# Patient Record
Sex: MALE | Race: WHITE | NOT HISPANIC OR LATINO | URBAN - METROPOLITAN AREA
[De-identification: names, ages, dates, MRNs, and addresses within clinical notes are randomized per-mention and may not be internally consistent; named-entity substitution may affect disease eponyms.]

---

## 2018-07-17 ENCOUNTER — OFFICE VISIT (OUTPATIENT)
Dept: FAMILY MEDICINE | Facility: CLINIC | Age: 43
End: 2018-07-17
Payer: COMMERCIAL

## 2018-07-17 VITALS
TEMPERATURE: 98.4 F | HEART RATE: 84 BPM | WEIGHT: 180 LBS | BODY MASS INDEX: 25.77 KG/M2 | OXYGEN SATURATION: 95 % | SYSTOLIC BLOOD PRESSURE: 119 MMHG | HEIGHT: 70 IN | DIASTOLIC BLOOD PRESSURE: 75 MMHG

## 2018-07-17 DIAGNOSIS — F41.1 GAD (GENERALIZED ANXIETY DISORDER): ICD-10-CM

## 2018-07-17 DIAGNOSIS — N52.9 ERECTILE DYSFUNCTION, UNSPECIFIED ERECTILE DYSFUNCTION TYPE: Primary | ICD-10-CM

## 2018-07-17 LAB
ALBUMIN SERPL-MCNC: 4.1 G/DL (ref 3.4–5)
ALP SERPL-CCNC: 53 U/L (ref 40–150)
ALT SERPL W P-5'-P-CCNC: 27 U/L (ref 0–70)
ANION GAP SERPL CALCULATED.3IONS-SCNC: 9 MMOL/L (ref 3–14)
AST SERPL W P-5'-P-CCNC: 16 U/L (ref 0–45)
BILIRUB SERPL-MCNC: 0.3 MG/DL (ref 0.2–1.3)
BUN SERPL-MCNC: 16 MG/DL (ref 7–30)
CALCIUM SERPL-MCNC: 8.9 MG/DL (ref 8.5–10.1)
CHLORIDE SERPL-SCNC: 105 MMOL/L (ref 94–109)
CHOLEST SERPL-MCNC: 257 MG/DL
CO2 SERPL-SCNC: 27 MMOL/L (ref 20–32)
CREAT SERPL-MCNC: 1.17 MG/DL (ref 0.66–1.25)
ERYTHROCYTE [DISTWIDTH] IN BLOOD BY AUTOMATED COUNT: 12.8 % (ref 10–15)
GFR SERPL CREATININE-BSD FRML MDRD: 69 ML/MIN/1.7M2
GLUCOSE SERPL-MCNC: 102 MG/DL (ref 70–99)
HBA1C MFR BLD: 5.2 % (ref 0–5.6)
HCT VFR BLD AUTO: 42.9 % (ref 40–53)
HDLC SERPL-MCNC: 62 MG/DL
HGB BLD-MCNC: 14.9 G/DL (ref 13.3–17.7)
LDLC SERPL CALC-MCNC: 162 MG/DL
MCH RBC QN AUTO: 30.7 PG (ref 26.5–33)
MCHC RBC AUTO-ENTMCNC: 34.7 G/DL (ref 31.5–36.5)
MCV RBC AUTO: 88 FL (ref 78–100)
NONHDLC SERPL-MCNC: 195 MG/DL
PLATELET # BLD AUTO: 171 10E9/L (ref 150–450)
POTASSIUM SERPL-SCNC: 3.4 MMOL/L (ref 3.4–5.3)
PROT SERPL-MCNC: 7.8 G/DL (ref 6.8–8.8)
RBC # BLD AUTO: 4.86 10E12/L (ref 4.4–5.9)
SODIUM SERPL-SCNC: 141 MMOL/L (ref 133–144)
TRIGL SERPL-MCNC: 164 MG/DL
VIT B12 SERPL-MCNC: 292 PG/ML (ref 193–986)
WBC # BLD AUTO: 6.2 10E9/L (ref 4–11)

## 2018-07-17 PROCEDURE — 85027 COMPLETE CBC AUTOMATED: CPT | Performed by: FAMILY MEDICINE

## 2018-07-17 PROCEDURE — 80061 LIPID PANEL: CPT | Performed by: FAMILY MEDICINE

## 2018-07-17 PROCEDURE — 36415 COLL VENOUS BLD VENIPUNCTURE: CPT | Performed by: FAMILY MEDICINE

## 2018-07-17 PROCEDURE — 80053 COMPREHEN METABOLIC PANEL: CPT | Performed by: FAMILY MEDICINE

## 2018-07-17 PROCEDURE — 99214 OFFICE O/P EST MOD 30 MIN: CPT | Performed by: FAMILY MEDICINE

## 2018-07-17 PROCEDURE — 82607 VITAMIN B-12: CPT | Performed by: FAMILY MEDICINE

## 2018-07-17 PROCEDURE — 83036 HEMOGLOBIN GLYCOSYLATED A1C: CPT | Performed by: FAMILY MEDICINE

## 2018-07-17 RX ORDER — SILDENAFIL 25 MG/1
25 TABLET, FILM COATED ORAL DAILY PRN
Qty: 12 TABLET | Refills: 1 | Status: SHIPPED | OUTPATIENT
Start: 2018-07-17 | End: 2018-07-17 | Stop reason: ALTCHOICE

## 2018-07-17 RX ORDER — SILDENAFIL CITRATE 20 MG/1
20 TABLET ORAL PRN
Qty: 30 TABLET | Refills: 1 | Status: SHIPPED | OUTPATIENT
Start: 2018-07-17 | End: 2024-03-01

## 2018-07-17 ASSESSMENT — ANXIETY QUESTIONNAIRES
1. FEELING NERVOUS, ANXIOUS, OR ON EDGE: SEVERAL DAYS
3. WORRYING TOO MUCH ABOUT DIFFERENT THINGS: SEVERAL DAYS
GAD7 TOTAL SCORE: 6
6. BECOMING EASILY ANNOYED OR IRRITABLE: SEVERAL DAYS
7. FEELING AFRAID AS IF SOMETHING AWFUL MIGHT HAPPEN: NOT AT ALL
2. NOT BEING ABLE TO STOP OR CONTROL WORRYING: SEVERAL DAYS
5. BEING SO RESTLESS THAT IT IS HARD TO SIT STILL: SEVERAL DAYS
IF YOU CHECKED OFF ANY PROBLEMS ON THIS QUESTIONNAIRE, HOW DIFFICULT HAVE THESE PROBLEMS MADE IT FOR YOU TO DO YOUR WORK, TAKE CARE OF THINGS AT HOME, OR GET ALONG WITH OTHER PEOPLE: SOMEWHAT DIFFICULT

## 2018-07-17 ASSESSMENT — PATIENT HEALTH QUESTIONNAIRE - PHQ9: 5. POOR APPETITE OR OVEREATING: SEVERAL DAYS

## 2018-07-17 NOTE — LETTER
July 24, 2018      Kenroy Garrido  [address] [facility], [place] [address]        Dear ,    We are writing to inform you of your test results.    We have tried to contact you by phone several times.     Your recent labs looked good except cholesterol was high.    Complete Metabolic Panel (panel that checks liver function, kidney function and electrolytes) was normal.   A1c was normal- no evidence of Diabetes.  Vitamin B12 was on the low side of normal. Recommend taking over the counter Vit B12 supplementation 1000 mcg/day.    Cholesterol was elevated.   Your LDL (bad cholesterol) goal is < 130; yours was 162.   Recommend in addition to eating a heart healthy diet to start a cholesterol lowering medication.  Rx sent  Will repeat labs in 3-6 months. You can schedule a fasting lab appointment then.      Resulted Orders   Lipid Profile   Result Value Ref Range    Cholesterol 257 (H) <200 mg/dL      Comment:      Desirable:       <200 mg/dl    Triglycerides 164 (H) <150 mg/dL      Comment:      Borderline high:  150-199 mg/dl  High:             200-499 mg/dl  Very high:       >499 mg/dl  Non Fasting      HDL Cholesterol 62 >39 mg/dL    LDL Cholesterol Calculated 162 (H) <100 mg/dL      Comment:      Above desirable:  100-129 mg/dl  Borderline High:  130-159 mg/dL  High:             160-189 mg/dL  Very high:       >189 mg/dl      Non HDL Cholesterol 195 (H) <130 mg/dL      Comment:      Above Desirable:  130-159 mg/dl  Borderline high:  160-189 mg/dl  High:             190-219 mg/dl  Very high:       >219 mg/dl     Hemoglobin A1c   Result Value Ref Range    Hemoglobin A1C 5.2 0 - 5.6 %      Comment:      Normal <5.7% Prediabetes 5.7-6.4%  Diabetes 6.5% or higher - adopted from ADA   consensus guidelines.     Comprehensive metabolic panel   Result Value Ref Range    Sodium 141 133 - 144 mmol/L    Potassium 3.4 3.4 - 5.3 mmol/L    Chloride 105 94 - 109 mmol/L    Carbon Dioxide 27 20 - 32 mmol/L    Anion Gap 9 3 - 14  mmol/L    Glucose 102 (H) 70 - 99 mg/dL      Comment:      Non Fasting    Urea Nitrogen 16 7 - 30 mg/dL    Creatinine 1.17 0.66 - 1.25 mg/dL    GFR Estimate 69 >60 mL/min/1.7m2      Comment:      Non  GFR Calc    GFR Estimate If Black 83 >60 mL/min/1.7m2      Comment:       GFR Calc    Calcium 8.9 8.5 - 10.1 mg/dL    Bilirubin Total 0.3 0.2 - 1.3 mg/dL    Albumin 4.1 3.4 - 5.0 g/dL    Protein Total 7.8 6.8 - 8.8 g/dL    Alkaline Phosphatase 53 40 - 150 U/L    ALT 27 0 - 70 U/L    AST 16 0 - 45 U/L   Vitamin B12   Result Value Ref Range    Vitamin B12 292 193 - 986 pg/mL   CBC with platelets   Result Value Ref Range    WBC 6.2 4.0 - 11.0 10e9/L    RBC Count 4.86 4.4 - 5.9 10e12/L    Hemoglobin 14.9 13.3 - 17.7 g/dL    Hematocrit 42.9 40.0 - 53.0 %    MCV 88 78 - 100 fl    MCH 30.7 26.5 - 33.0 pg    MCHC 34.7 31.5 - 36.5 g/dL    RDW 12.8 10.0 - 15.0 %    Platelet Count 171 150 - 450 10e9/L       If you have any questions or concerns, please call the clinic at the number listed above.       Sincerely,        Dianne Fofana MD/mp

## 2018-07-17 NOTE — MR AVS SNAPSHOT
After Visit Summary   7/17/2018    Kenroy Garrido    MRN: [id number]           Patient Information     Date Of Birth          1975        Visit Information        Provider Department      7/17/2018 2:30 PM Dianne Fofana MD [facility]        Today's Diagnoses     Erectile dysfunction, unspecified erectile dysfunction type    -  1    SHELLI (generalized anxiety disorder)          Care Instructions      Understanding Erectile Dysfunction    Erectile dysfunction (ED) is a problem getting an erection firm enough or keeping it long enough for intercourse. The problem can happen to any man at any age. But health problems that can lead to ED become more common as a man ages. Up to half of men over age 40 experience ED at some point.  Causes of ED  ED can have many causes. Most are physical. Some are emotional issues. Often, a combination of causes is involved. Causes of ED may include:    Medical conditions such as diabetes or depression    Smoking tobacco or marijuana    Drinking too much alcohol    Side effects of medications    Injury to nerves or blood vessels    Emotional issues such as stress or relationship problems  ED can be treated  Prescription medications for ED are available. They help many men who try them. Depending upon the cause of the ED, though, medications may not be enough. In these cases, other treatment options are available. These include erectile aids and surgery. Your health care provider can tell you more about the treatment that is right for you. And new treatments for ED are being studied. No matter what the treatment you decide on, stay in touch with your doctor. If your symptoms persist, he or she may be able to adjust your current treatment or try something new.  Date Last Reviewed: 1/1/2017 2000-2017 [employer]. [address] [place] [address], [place] [address]. All rights reserved. This information is not intended as a substitute for professional  medical care. Always follow your healthcare professional's instructions.        Anxiety Reaction  Anxiety is the feeling we all get when we think something bad might happen. It is a normal response to stress and usually causes only a mild reaction. When anxiety becomes more severe, it can interfere with daily life. In some cases, you may not even be aware of what it is you re anxious about. There may also be a genetic link or it may be a learned behavior in the home.  Both psychological and physical triggers cause stress reaction. It's often a response to fear or emotional stress, real or imagined. This stress may come from home, family, work, or social relationships.  During an anxiety reaction, you may feel:    Helpless    Nervous    Depressed    Irritable  Your body may show signs of anxiety in many ways. You may experience:    Dry mouth    Shakiness    Dizziness    Weakness    Trouble breathing    Breathing fast (hyperventilating)    Chest pressure    Sweating    Headache    Nausea    Diarrhea    Tiredness    Inability to sleep    Sexual problems  Home care    Try to locate the sources of stress in your life. They may not be obvious. These may include:  ? Daily hassles of life (such as traffic jams, missed appointments, or car troubles)  ? Major life changes, both good (new baby or job promotion) and bad (loss of job or loss of loved one)  ? Overload: feeling that you have too many responsibilities and can't take care of all of them at once  ? Feeling helpless or feeling that your problems are beyond what you re able to solve    Notice how your body reacts to stress. Learn to listen to your body signals. This will help you take action before the stress becomes severe.    When you can, do something about the source of your stress. (Avoid hassles, limit the amount of change that happens in your life at one time and take a break when you feel overloaded).    Unfortunately, many stressful situations can't be  avoided. It is necessary to learn how to better manage stress. There are many proven methods that will reduce your anxiety. These include simple things like exercise, good nutrition, and adequate rest. Also, there are certain techniques that are helpful:  ? Relaxation  ? Breathing exercises  ? Visualization  ? Biofeedback  ? Meditation  For more information about this, consult your healthcare provider or go to a local bookstore and review the many books and tapes available on this subject.  Follow-up care  If you feel that your anxiety is not responding to self-help measures, contact your healthcare provider or make an appointment with a counselor. You may need short-term psychological counseling and temporary medicine to help you manage stress.  Call 911  Call 911 if any of these happen:    Trouble breathing    Confusion    Drowsiness or trouble wakening    Fainting or loss of consciousness    Rapid heart rate    Seizure    New chest pain that becomes more severe, lasts longer, or spreads into your shoulder, arm, neck, jaw, or back  When to seek medical advice  Call your healthcare provider right away if any of these happen:    Your symptoms get worse    Severe headache not relieved by rest and mild pain reliever  Date Last Reviewed: 10/1/2017    9019-4914 [employer]. [address] [place] [address]. All rights reserved. This information is not intended as a substitute for professional medical care. Always follow your healthcare professional's instructions.                Follow-ups after your visit        Follow-up notes from your care team     Return in about 1 month (around 8/17/2018), or if symptoms worsen or fail to improve.      Who to contact     Normal or non-critical lab and imaging results will be communicated to you by MyChart, letter or phone within 4 business days after the clinic has received the results. If you do not hear from us within 7 days, please contact the clinic  "through [employer] or phone. If you have a critical or abnormal lab result, we will notify you by phone as soon as possible.  Submit refill requests through [employer] or call your pharmacy and they will forward the refill request to us. Please allow 3 business days for your refill to be completed.          If you need to speak with a  for additional information , please call: [phone]             Additional Information About Your Visit        [employer] Information     [employer] gives you secure access to your electronic health record. If you see a primary care provider, you can also send messages to your care team and make appointments. If you have questions, please call your primary care clinic.  If you do not have a primary care provider, please call [phone] and they will assist you.        Care EveryWhere ID     This is your Care EveryWhere ID. This could be used by other organizations to access your [facility] medical records  [id number]        Your Vitals Were     Pulse Temperature Height Pulse Oximetry BMI (Body Mass Index)       84 98.4  F (36.9  C) (Oral) 5' 10.25\" (1.784 m) 95% 25.64 kg/m2        Blood Pressure from Last 3 Encounters:   07/17/18 119/75   12/27/14 108/64   08/06/13 106/70    Weight from Last 3 Encounters:   07/17/18 180 lb (81.6 kg)   12/27/14 169 lb (76.7 kg)   08/06/13 163 lb 6.4 oz (74.1 kg)              We Performed the Following     CBC with platelets     Comprehensive metabolic panel     Hemoglobin A1c     Lipid Profile     Vitamin B12          Today's Medication Changes          These changes are accurate as of 7/17/18  3:24 PM.  If you have any questions, ask your nurse or doctor.               Start taking these medicines.        Dose/Directions    sildenafil 25 MG tablet   Commonly known as:  VIAGRA   Used for:  Erectile dysfunction, unspecified erectile dysfunction type   Started by:  Dianne Fofana MD        Dose:  25 mg   Take 1 tablet (25 mg) by mouth daily " as needed 30 min to 4 hrs before sex. Do not use with nitroglycerin, terazosin or doxazosin.   Quantity:  12 tablet   Refills:  1            Where to get your medicines      These medications were sent to [facility] [place] - [address] [facility]  [address] [facility] [place] [address]     Phone:  [phone]     sildenafil 25 MG tablet                Primary Care Provider Office Phone # Fax #    Molly Stuart [phone]       [address] [place] [address]        Equal Access to Services     [facility]: Hadii aad ku hadasho Soomaali, waaxda luqadaha, qaybta kaalmada adeegyada, waxay natasha haymamie rothman . So [facility] [place] [phone].    ATENCIÓN: Si habla español, tiene a muñiz disposición servicios gratuitos de asistencia lingüística. [facility] [phone].    We comply with applicable federal civil rights laws and [place] laws. We do not discriminate on the basis of race, color, national origin, age, disability, sex, sexual orientation, or gender identity.            Thank you!     Thank you for choosing [facility]  for your care. Our goal is always to provide you with excellent care. Hearing back from our patients is one way we can continue to improve our services. Please take a few minutes to complete the written survey that you may receive in the mail after your visit with us. Thank you!             Your Updated Medication List - Protect others around you: Learn how to safely use, store and throw away your medicines at www.disposemymeds.org.          This list is accurate as of 7/17/18  3:24 PM.  Always use your most recent med list.                   Brand Name Dispense Instructions for use Diagnosis    NYQUIL MULTI-SYMPTOM OR      Take 10 mLs by mouth nightly as needed        sildenafil 25 MG tablet    VIAGRA    12 tablet    Take 1 tablet (25 mg) by mouth daily as needed 30 min to 4 hrs before sex. Do not use with nitroglycerin,  terazosin or doxazosin.    Erectile dysfunction, unspecified erectile dysfunction type

## 2018-07-17 NOTE — PATIENT INSTRUCTIONS
Understanding Erectile Dysfunction    Erectile dysfunction (ED) is a problem getting an erection firm enough or keeping it long enough for intercourse. The problem can happen to any man at any age. But health problems that can lead to ED become more common as a man ages. Up to half of men over age 40 experience ED at some point.  Causes of ED  ED can have many causes. Most are physical. Some are emotional issues. Often, a combination of causes is involved. Causes of ED may include:    Medical conditions such as diabetes or depression    Smoking tobacco or marijuana    Drinking too much alcohol    Side effects of medications    Injury to nerves or blood vessels    Emotional issues such as stress or relationship problems  ED can be treated  Prescription medications for ED are available. They help many men who try them. Depending upon the cause of the ED, though, medications may not be enough. In these cases, other treatment options are available. These include erectile aids and surgery. Your health care provider can tell you more about the treatment that is right for you. And new treatments for ED are being studied. No matter what the treatment you decide on, stay in touch with your doctor. If your symptoms persist, he or she may be able to adjust your current treatment or try something new.  Date Last Reviewed: 1/1/2017 2000-2017 [employer]. [address] [place] [address], [place] [address]. All rights reserved. This information is not intended as a substitute for professional medical care. Always follow your healthcare professional's instructions.        Anxiety Reaction  Anxiety is the feeling we all get when we think something bad might happen. It is a normal response to stress and usually causes only a mild reaction. When anxiety becomes more severe, it can interfere with daily life. In some cases, you may not even be aware of what it is you re anxious about. There may also be a genetic link or it may  be a learned behavior in the home.  Both psychological and physical triggers cause stress reaction. It's often a response to fear or emotional stress, real or imagined. This stress may come from home, family, work, or social relationships.  During an anxiety reaction, you may feel:    Helpless    Nervous    Depressed    Irritable  Your body may show signs of anxiety in many ways. You may experience:    Dry mouth    Shakiness    Dizziness    Weakness    Trouble breathing    Breathing fast (hyperventilating)    Chest pressure    Sweating    Headache    Nausea    Diarrhea    Tiredness    Inability to sleep    Sexual problems  Home care    Try to locate the sources of stress in your life. They may not be obvious. These may include:  ? Daily hassles of life (such as traffic jams, missed appointments, or car troubles)  ? Major life changes, both good (new baby or job promotion) and bad (loss of job or loss of loved one)  ? Overload: feeling that you have too many responsibilities and can't take care of all of them at once  ? Feeling helpless or feeling that your problems are beyond what you re able to solve    Notice how your body reacts to stress. Learn to listen to your body signals. This will help you take action before the stress becomes severe.    When you can, do something about the source of your stress. (Avoid hassles, limit the amount of change that happens in your life at one time and take a break when you feel overloaded).    Unfortunately, many stressful situations can't be avoided. It is necessary to learn how to better manage stress. There are many proven methods that will reduce your anxiety. These include simple things like exercise, good nutrition, and adequate rest. Also, there are certain techniques that are helpful:  ? Relaxation  ? Breathing exercises  ? Visualization  ? Biofeedback  ? Meditation  For more information about this, consult your healthcare provider or go to a local bookstore and review  the many books and tapes available on this subject.  Follow-up care  If you feel that your anxiety is not responding to self-help measures, contact your healthcare provider or make an appointment with a counselor. You may need short-term psychological counseling and temporary medicine to help you manage stress.  Call 911  Call 911 if any of these happen:    Trouble breathing    Confusion    Drowsiness or trouble wakening    Fainting or loss of consciousness    Rapid heart rate    Seizure    New chest pain that becomes more severe, lasts longer, or spreads into your shoulder, arm, neck, jaw, or back  When to seek medical advice  Call your healthcare provider right away if any of these happen:    Your symptoms get worse    Severe headache not relieved by rest and mild pain reliever  Date Last Reviewed: 10/1/2017    5190-0872 [employer]. [address] [place] [address], [place] [address]. All rights reserved. This information is not intended as a substitute for professional medical care. Always follow your healthcare professional's instructions.

## 2018-07-17 NOTE — PROGRESS NOTES
SUBJECTIVE:   Kenroy Garrido is a 43 year old male who presents to clinic today for the following health issues:      New Patient/Transfer of Care    Patient is here to discuss medication options for erectile dysfunction.   States that he's noticed that over the past 6-12 months, he's had a hard time maintaining an erection and having premature ejaculation.   Admits that his work is stressful and travels a lot for work and does not always eat healthy as he eats out alot.    SHELLI-7   [employer], 2002; Used with Permission) 7/17/2018   1. Feeling nervous, anxious, or on edge 1   2. Not being able to stop or control worrying 1   3. Worrying too much about different things 1   4. Trouble relaxing 1   5. Being so restless that it is hard to sit still 1   6. Becoming easily annoyed or irritable 1   7. Feeling afraid, as if something awful might happen 0   SHELLI-7 Total Score 6   If you checked any problems, how difficult have they made it for you to do your work, take care of things at home, or get along with other people? Somewhat difficult     PHQ-9 ([employer]) 7/17/2018   1.  Little interest or pleasure in doing things 0   2.  Feeling down, depressed, or hopeless 0   3.  Trouble falling or staying asleep, or sleeping too much 0   4.  Feeling tired or having little energy 1   5.  Poor appetite or overeating 1   6.  Feeling bad about yourself 0   7.  Trouble concentrating 3   8.  Moving slowly or restless 0   9.  Suicidal or self-harm thoughts 0   PHQ-9 Total Score 5   Difficulty at work, home, or with people Somewhat difficult           Problem list and histories reviewed & adjusted, as indicated.  Additional history: as documented    Patient Active Problem List   Diagnosis     CARDIOVASCULAR SCREENING; LDL GOAL LESS THAN 160     SHELLI (generalized anxiety disorder)     Erectile dysfunction, unspecified erectile dysfunction type     Past Surgical History:   Procedure Laterality Date     EYE SURGERY  12/20/2016    PRK     None       "   Social History   Substance Use Topics     Smoking status: Never Smoker     Smokeless tobacco: Never Used     Alcohol use Yes      Comment: 1 drink a month     Family History   Problem Relation Age of Onset     Cancer Mother      Other Cancer Mother      Hyperlipidemia Father      Hyperlipidemia Brother          Current Outpatient Prescriptions   Medication Sig Dispense Refill     Pseudoeph-Doxylamine-DM-APAP (NYQUIL MULTI-SYMPTOM OR) Take 10 mLs by mouth nightly as needed       sildenafil (REVATIO) 20 MG tablet Take 1 tablet (20 mg) by mouth as needed 30 min to 4 hrs before sex. Do not use with nitroglycerin, terazosin or doxazosin. 30 tablet 1     No Known Allergies    Reviewed and updated as needed this visit by clinical staff  Tobacco  Allergies  Meds  Problems  Med Hx  Surg Hx  Fam Hx  Soc Hx        Reviewed and updated as needed this visit by Provider  Allergies  Meds  Problems         ROS:  Constitutional, HEENT, cardiovascular, pulmonary, gi and gu systems are negative, except as otherwise noted.    OBJECTIVE:     /75 (BP Location: Left arm, Cuff Size: Adult Large)  Pulse 84  Temp 98.4  F (36.9  C) (Oral)  Ht 5' 10.25\" (1.784 m)  Wt 180 lb (81.6 kg)  SpO2 95%  BMI 25.64 kg/m2  Body mass index is 25.64 kg/(m^2).  GENERAL: healthy, alert and no distress   (male): Deferred  PSYCH: mentation appears normal, affect normal/bright    Diagnostic Test Results:  Labs in process    ASSESSMENT/PLAN:     Kenroy was seen today for new patient and medication request.    Diagnoses and all orders for this visit:    Erectile dysfunction, unspecified erectile dysfunction type  Will obtain labs to further evaluate and a trial of Sildenafil  -     Lipid Profile  -     Hemoglobin A1c  -     Comprehensive metabolic panel  -     Vitamin B12  -     CBC with platelets  - PHQ-9/SHELLI 7 completed, see above/Epic for details    -     Trial: sildenafil (REVATIO) 20 MG tablet; Take 1 tablet (20 mg) by mouth as needed " 30 min to 4 hrs before sex. Do not use with nitroglycerin, terazosin or doxazosin.    SHELLI (generalized anxiety disorder)  - PHQ-9/SHELLI 7 completed, see above/Epic for details    Recommended non-pharmacological anxiety reduction and stress reduction techniques.   Re-evaluate in a month      Follow up in a month, sooner if needed    Dianne Fofana MD  [facility]

## 2018-07-19 PROBLEM — N52.9 ERECTILE DYSFUNCTION, UNSPECIFIED ERECTILE DYSFUNCTION TYPE: Status: ACTIVE | Noted: 2018-07-19

## 2018-07-19 PROBLEM — F41.1 GAD (GENERALIZED ANXIETY DISORDER): Status: ACTIVE | Noted: 2018-07-19

## 2018-07-20 DIAGNOSIS — E78.5 HYPERLIPIDEMIA LDL GOAL <130: Primary | ICD-10-CM

## 2018-07-20 RX ORDER — ATORVASTATIN CALCIUM 20 MG/1
20 TABLET, FILM COATED ORAL DAILY
Qty: 90 TABLET | Refills: 3 | Status: SHIPPED | OUTPATIENT
Start: 2018-07-20

## 2018-07-20 ASSESSMENT — PATIENT HEALTH QUESTIONNAIRE - PHQ9: SUM OF ALL RESPONSES TO PHQ QUESTIONS 1-9: 5

## 2018-07-20 ASSESSMENT — ANXIETY QUESTIONNAIRES: GAD7 TOTAL SCORE: 6

## 2018-08-24 ENCOUNTER — OFFICE VISIT (OUTPATIENT)
Dept: FAMILY MEDICINE | Facility: CLINIC | Age: 43
End: 2018-08-24
Payer: COMMERCIAL

## 2018-08-24 ENCOUNTER — RADIANT APPOINTMENT (OUTPATIENT)
Dept: GENERAL RADIOLOGY | Facility: CLINIC | Age: 43
End: 2018-08-24
Attending: PHYSICIAN ASSISTANT
Payer: COMMERCIAL

## 2018-08-24 VITALS
TEMPERATURE: 98.3 F | SYSTOLIC BLOOD PRESSURE: 132 MMHG | HEART RATE: 60 BPM | WEIGHT: 176.6 LBS | OXYGEN SATURATION: 97 % | DIASTOLIC BLOOD PRESSURE: 76 MMHG | BODY MASS INDEX: 25.16 KG/M2

## 2018-08-24 DIAGNOSIS — S29.9XXA INJURY OF CHEST WALL, INITIAL ENCOUNTER: ICD-10-CM

## 2018-08-24 DIAGNOSIS — S29.9XXA INJURY OF CHEST WALL, INITIAL ENCOUNTER: Primary | ICD-10-CM

## 2018-08-24 PROCEDURE — 99214 OFFICE O/P EST MOD 30 MIN: CPT | Performed by: PHYSICIAN ASSISTANT

## 2018-08-24 PROCEDURE — 71101 X-RAY EXAM UNILAT RIBS/CHEST: CPT | Mod: RT

## 2018-08-24 NOTE — PROGRESS NOTES
SUBJECTIVE:   Kenroy Garrido is a 43 year old male who presents to clinic today for the following health issues:  Joint Pain    Onset: couple weeks ago    Description:   Location: Right side muscle pain axillary area  Character: Sharp    Intensity: when moving at worse 8/10, now 2/10    Progression of Symptoms: worse    Accompanying Signs & Symptoms:  Other symptoms: pain when taking deep breaths, lifting arm, hard to breath or left side, shortness of breath     History:   Previous similar pain: no       Precipitating factors:   Trauma or overuse: playing Volleyball    Alleviating factors:  Improved by: nothing  Therapies Tried and outcome: none        Pain with movement and deep breathing. Sob with leaning on left side. No profound HERNANDEZ. Symptoms worsening.     Problem list and histories reviewed & adjusted, as indicated.  Additional history: as documented    Patient Active Problem List   Diagnosis     CARDIOVASCULAR SCREENING; LDL GOAL LESS THAN 160     SHELLI (generalized anxiety disorder)     Erectile dysfunction, unspecified erectile dysfunction type     Past Surgical History:   Procedure Laterality Date     EYE SURGERY  12/20/2016    PRK     None         Social History   Substance Use Topics     Smoking status: Never Smoker     Smokeless tobacco: Never Used     Alcohol use Yes      Comment: 1 drink a month     Family History   Problem Relation Age of Onset     Cancer Mother      Other Cancer Mother      Hyperlipidemia Father      Hyperlipidemia Brother          Current Outpatient Prescriptions   Medication Sig Dispense Refill     sildenafil (REVATIO) 20 MG tablet Take 1 tablet (20 mg) by mouth as needed 30 min to 4 hrs before sex. Do not use with nitroglycerin, terazosin or doxazosin. 30 tablet 1     atorvastatin (LIPITOR) 20 MG tablet Take 1 tablet (20 mg) by mouth daily (Patient not taking: Reported on 8/24/2018) 90 tablet 3     Pseudoeph-Doxylamine-DM-APAP (NYQUIL MULTI-SYMPTOM OR) Take 10 mLs by mouth nightly as  needed       No Known Allergies  Recent Labs   Lab Test  07/17/18   1529   A1C  5.2   LDL  162*   HDL  62   TRIG  164*   ALT  27   CR  1.17   GFRESTIMATED  69   GFRESTBLACK  83   POTASSIUM  3.4      BP Readings from Last 3 Encounters:   08/24/18 132/76   07/17/18 119/75   12/27/14 108/64    Wt Readings from Last 3 Encounters:   08/24/18 176 lb 9.6 oz (80.1 kg)   07/17/18 180 lb (81.6 kg)   12/27/14 169 lb (76.7 kg)                  Labs reviewed in EPIC    Reviewed and updated as needed this visit by clinical staff       Reviewed and updated as needed this visit by Provider         All other systems negative except as outline above  OBJECTIVE:    Eye exam - right eye normal lid, conjunctiva, cornea, pupil and fundus, left eye normal lid, conjunctiva, cornea, pupil and fundus.  Thyroid not palpable, not enlarged, no nodules detected.  CHEST:chest clear to IPPA, no tachypnea, retractions or cyanosis and S1, S2 normal, no murmur, no gallop, rate regular.  Tenderness with palpation of right anterior rib cage (ribs 4-6). No crepitations.   The abdomen is soft without tenderness, guarding, mass, rebound or organomegaly. Bowel sounds are normal. No CVA tenderness or inguinal adenopathy noted.    Kenroy was seen today for musculoskeletal problem.    Diagnoses and all orders for this visit:    Injury of chest wall, initial encounter  -     XR Ribs & Chest Right G/E 3 Views; Future      Advised supportive and symptomatic treatment.  Follow up with Provider - if condition persists or worsens.

## 2018-11-10 ENCOUNTER — TRANSFERRED RECORDS (OUTPATIENT)
Dept: HEALTH INFORMATION MANAGEMENT | Facility: CLINIC | Age: 43
End: 2018-11-10

## 2018-12-21 ENCOUNTER — TRANSFERRED RECORDS (OUTPATIENT)
Dept: HEALTH INFORMATION MANAGEMENT | Facility: CLINIC | Age: 43
End: 2018-12-21

## 2019-08-23 ENCOUNTER — OFFICE VISIT (OUTPATIENT)
Dept: FAMILY MEDICINE | Facility: CLINIC | Age: 44
End: 2019-08-23
Payer: COMMERCIAL

## 2019-08-23 ENCOUNTER — ANCILLARY PROCEDURE (OUTPATIENT)
Dept: GENERAL RADIOLOGY | Facility: CLINIC | Age: 44
End: 2019-08-23
Attending: PHYSICIAN ASSISTANT
Payer: COMMERCIAL

## 2019-08-23 VITALS
HEIGHT: 70 IN | DIASTOLIC BLOOD PRESSURE: 69 MMHG | HEART RATE: 72 BPM | OXYGEN SATURATION: 98 % | RESPIRATION RATE: 24 BRPM | TEMPERATURE: 96.3 F | BODY MASS INDEX: 24.91 KG/M2 | WEIGHT: 174 LBS | SYSTOLIC BLOOD PRESSURE: 113 MMHG

## 2019-08-23 DIAGNOSIS — M25.511 ACUTE PAIN OF RIGHT SHOULDER: Primary | ICD-10-CM

## 2019-08-23 DIAGNOSIS — R20.2 TINGLING OF RIGHT UPPER EXTREMITY: ICD-10-CM

## 2019-08-23 DIAGNOSIS — M25.511 ACUTE PAIN OF RIGHT SHOULDER: ICD-10-CM

## 2019-08-23 PROCEDURE — 73030 X-RAY EXAM OF SHOULDER: CPT | Mod: RT

## 2019-08-23 PROCEDURE — 99213 OFFICE O/P EST LOW 20 MIN: CPT | Performed by: PHYSICIAN ASSISTANT

## 2019-08-23 RX ORDER — PREDNISONE 20 MG/1
TABLET ORAL
Qty: 10 TABLET | Refills: 0 | Status: SHIPPED | OUTPATIENT
Start: 2019-08-23 | End: 2024-03-01

## 2019-08-23 ASSESSMENT — MIFFLIN-ST. JEOR: SCORE: 1700.51

## 2019-08-23 NOTE — PROGRESS NOTES
"Subjective     Kenroy Garrido is a 44 year old male who presents to clinic today for the following health issues:    HPI   Musculoskeletal problem/pain      Duration: on/off  x1yrs    Description  Location: right arm     Intensity:  Severing in the 2-3wks     Accompanying signs and symptoms: radiation of pain from shoulder up to the neck down the arm, numbness, tingling and weakness of hand     History  Previous similar problem: no   Previous evaluation:  none    Precipitating or alleviating factors:  Trauma or overuse: no   Aggravating factors include: overuse    Therapies tried and outcome: stretching and icy hot     Tingling occurs when he's moving his arm around  Shooting pains down the upper arm  Denies neck pain      PROBLEMS TO ADD ON...  none  -------------------------------------    Patient Active Problem List   Diagnosis     CARDIOVASCULAR SCREENING; LDL GOAL LESS THAN 160     SHELLI (generalized anxiety disorder)     Erectile dysfunction, unspecified erectile dysfunction type     Past Surgical History:   Procedure Laterality Date     EYE SURGERY  12/20/2016    PRK     None         Social History     Tobacco Use     Smoking status: Never Smoker     Smokeless tobacco: Never Used   Substance Use Topics     Alcohol use: Yes     Comment: 1 drink a month     Family History   Problem Relation Age of Onset     Cancer Mother      Other Cancer Mother      Hyperlipidemia Father      Hyperlipidemia Brother            Reviewed and updated as needed this visit by Provider         Review of Systems   ROS COMP: Constitutional, musculoskeletal, neuro systems are negative, except as otherwise noted.      Objective    /69   Pulse 72   Temp 96.3  F (35.7  C) (Tympanic)   Resp 24   Ht 1.786 m (5' 10.32\")   Wt 78.9 kg (174 lb)   SpO2 98%   BMI 24.74 kg/m    Body mass index is 24.74 kg/m .  Physical Exam   GENERAL: healthy, alert and no distress  MS: no gross musculoskeletal defects noted, no edema  MS: RUE exam shows " normal strength and muscle mass, ROM of all joints is normal, no evidence of joint instability and pain with lift off  NEURO: Normal strength and tone, mentation intact and speech normal  PSYCH: mentation appears normal, affect normal/bright        Assessment & Plan   Assessment  1. Acute pain of right shoulder    2. Tingling of right upper extremity         Plan  1,2) X-ray today. Prenisone x7 days. Referral to physical therapy. If no improvements after 6-8 weeks will obtain an MRI and refer to ortho.      Return in about 2 months (around 10/23/2019), or if symptoms worsen or fail to improve.    Magi Narvaez PA-C  [facility]

## 2019-08-29 ENCOUNTER — THERAPY VISIT (OUTPATIENT)
Dept: PHYSICAL THERAPY | Facility: CLINIC | Age: 44
End: 2019-08-29
Attending: PHYSICIAN ASSISTANT
Payer: COMMERCIAL

## 2019-08-29 DIAGNOSIS — R20.2 ARM PARESTHESIA, RIGHT: ICD-10-CM

## 2019-08-29 DIAGNOSIS — M25.511 CHRONIC RIGHT SHOULDER PAIN: ICD-10-CM

## 2019-08-29 DIAGNOSIS — G89.29 CHRONIC RIGHT SHOULDER PAIN: ICD-10-CM

## 2019-08-29 DIAGNOSIS — M25.511 ACUTE PAIN OF RIGHT SHOULDER: ICD-10-CM

## 2019-08-29 PROCEDURE — 97162 PT EVAL MOD COMPLEX 30 MIN: CPT | Mod: GP | Performed by: PHYSICAL THERAPIST

## 2019-08-29 PROCEDURE — 97110 THERAPEUTIC EXERCISES: CPT | Mod: GP | Performed by: PHYSICAL THERAPIST

## 2019-08-29 NOTE — PROGRESS NOTES
"[facility] Initial Evaluation  Subjective:  The history is provided by the patient. No  was used.   Type of problem:  Right shoulder   Condition occurred with:  Unknown cause. This is a chronic condition   Problem details: Pt states he has had pain for about a year or so without specific incident.  Seems like symptoms down the arm are more recently increasing.  Referred to PT 08/23/2019.   Patient reports pain:  Posterior. Radiates to:  Upper arm and lower arm. Associated with: pt notes tingling down the arm. Exacerbated by: using mouse, sidelying. Relieved by: avoiding the above, supine.    Amer Hema being seen for shoulder pain.   Problem occurred: not sure  and reported as 5/10 on pain scale. General health as reported by patient is excellent. Pertinent medical history includes:  None.  Medical allergies: N/A.  Surgeries include:  None.  Current medications:  Anti-inflammatory.   Primary job tasks include:  Computer work and driving.   and is intermittent (but happens \"about 20 times a day\"). Pain timing: no particular pattern re: time of day. Progression since onset: pt states anti-inflammatories have helped the pain but not the tingling. Imaging testing: xray as in chart 08/23/2019.     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    Barriers include:  None as reported by patient.  Red flags:  None as reported by patient.  Pt is R dominant.  Pt describes h/o issues at L shoulder where it is \"loose and slides around.\"  Pt indicates his goals are for the tingling to go away.                    Objective:  System              Cervical/Thoracic Evaluation      Cervical Myotomes:        C4 (shrug):  Left: 5    Right: 5  C5 (Deltoid):  Left: 5    Right: 5  C6 (Biceps):  Left: 5    Right: 5  C7 (Triceps):  Left: 5    Right: 5  C8 (Thumb Ext): Left: 5    Right: 5  T1 (Intrinsics): Left: 5    Right: 5    Neural Tension:      Left side:  " Radial; Ulnar and Median  negative.    Right side:  Radial; Ulnar and Median  negative.  Cervical Dermatomes:  normal                    Cervical Palpation:  : pt noted palpation to R infraspinatus reproduced tingling down the arm.          Spinal Segmental Conclusions:  Negative Spurling in neutral and extension.  Supine manual axial distraction reported to increase distal symptoms.                                                  Eduin Cervical Evaluation    Posture:  Sitting: poor  Standing: poor  Protruding Head: yes  Wry Neck: no  Correction of Posture: no effect    Movement Loss:  Protrusion (PRO): nil  Flexion (Flex): nil  Retraction (RET): mod  Extension (EXT): nil  Lateral Flexion Right (LF R): min  Lateral Flexion Left (LF L): min and pain  Rotation Right (ROT R): min  Rotation Left (ROT L): min and pain  Test Movements:  Pretest Pain Sitting: tingling down R arm to hand  PRO: During: no effect  After: no effect    Repeat PRO: During: no effect  After: no effect    RET: During: no effect  After: no effect    Repeat RET: During: no effect  After: no effect              LF R: During: no effect  After: no effect    Repeat LF R: During: no effect  After: no effect    LF L: During: increases  After: no worse    Repeat LF L: During: increases  After: no worse    Rot R: During: centralizing  After: centralizing    Repeat Rot R: During: centralizing  After: centralizing    Rot L: During: peripheralizing  After: peripheralizing    Repeat Rot L: During: peripheralizing  After: peripheralizing    Flex: During: no effect  After: no effect    Repeat Flex: During: no effect   After: no effect                                                  No effect on symptoms down the arm with cervical retraction prior to L rotation.  Supine R rotation with retraction reported to centralize.  ROS    Assessment/Plan:    Patient is a 44 year old male with right upper extremity complaints.    Patient has the following significant  findings with corresponding treatment plan.                Diagnosis 1:  R upper extremity paresthesias  Pain -  hot/cold therapy and directional preference exercise  Decreased ROM/flexibility - manual therapy and therapeutic exercise  Decreased function - therapeutic activities  Impaired posture - neuro re-education    Therapy Evaluation Codes:   1) History comprised of:   Personal factors that impact the plan of care:      Time since onset of symptoms.    Comorbidity factors that impact the plan of care are:      None.     Medications impacting care: Anti-inflammatory.  2) Examination of Body Systems comprised of:   Body structures and functions that impact the plan of care:      Cervical spine and Shoulder.   Activity limitations that impact the plan of care are:      Reading/Computer work and Sleeping.  3) Clinical presentation characteristics are:   Unstable/Unpredictable.  4) Decision-Making    Moderate complexity using standardized patient assessment instrument and/or measureable assessment of functional outcome.  Cumulative Therapy Evaluation is: Moderate complexity.    Ongoing assessment re: response to repeated movements.  Pt states he wonders about potential MRI but could also discuss EMG with referring provider.    Previous and current functional limitations:  (See Goal Flow Sheet for this information)    Short term and Long term goals: (See Goal Flow Sheet for this information)     Communication ability:  Patient appears to be able to clearly communicate and understand verbal and written communication and follow directions correctly.  Treatment Explanation - The following has been discussed with the patient:   RX ordered/plan of care  Anticipated outcomes  Possible risks and side effects  This patient would benefit from PT intervention to resume normal activities.   Rehab potential is fair to good.    Frequency:  1 X week, once daily  Duration:  for 6 weeks  Discharge Plan:  Achieve all  LTG.  Independent in home treatment program.  Reach maximal therapeutic benefit.    Please refer to the daily flowsheet for treatment today, total treatment time and time spent performing 1:1 timed codes.

## 2019-09-13 ENCOUNTER — THERAPY VISIT (OUTPATIENT)
Dept: PHYSICAL THERAPY | Facility: CLINIC | Age: 44
End: 2019-09-13
Payer: COMMERCIAL

## 2019-09-13 DIAGNOSIS — G89.29 CHRONIC RIGHT SHOULDER PAIN: ICD-10-CM

## 2019-09-13 DIAGNOSIS — M25.511 CHRONIC RIGHT SHOULDER PAIN: ICD-10-CM

## 2019-09-13 DIAGNOSIS — R20.2 ARM PARESTHESIA, RIGHT: ICD-10-CM

## 2019-09-13 PROCEDURE — 97112 NEUROMUSCULAR REEDUCATION: CPT | Mod: GP | Performed by: PHYSICAL THERAPIST

## 2019-09-13 PROCEDURE — 97140 MANUAL THERAPY 1/> REGIONS: CPT | Mod: GP | Performed by: PHYSICAL THERAPIST

## 2019-09-13 PROCEDURE — 97110 THERAPEUTIC EXERCISES: CPT | Mod: GP | Performed by: PHYSICAL THERAPIST

## 2019-09-13 NOTE — PROGRESS NOTES
Subjective:  HPI                    Objective:  System    Physical Exam    General     ROS    Assessment/Plan:    SUBJECTIVE  Subjective: Pt reports symptoms are less frequent.  Seems more predictable that is when driving, sitting at computer.   Current Pain level: (not rated numerically)   Changes in function:  None     Adverse reaction to treatment or activity:  None    OBJECTIVE  Objective: Tender to palpation R infraspinatus with reproduction of tingling down the arm.  Forward head and shoulders posture.  No change in symptoms cervical AROM all directions.     ASSESSMENT  Amer continues to require intervention to meet STG and LTG's: PT  Pleased to hear decreased frequency of symptoms.  Response to therapy has shown lack of progress in  function  Progress made towards STG/LTG?  None    PLAN  Assess response to manual work at infraspinatus.    PTA/ATC plan:  N/A    Please refer to the daily flowsheet for treatment today, total treatment time and time spent performing 1:1 timed codes.

## 2019-11-12 PROBLEM — G89.29 CHRONIC RIGHT SHOULDER PAIN: Status: RESOLVED | Noted: 2019-08-29 | Resolved: 2019-11-12

## 2019-11-12 PROBLEM — M25.511 CHRONIC RIGHT SHOULDER PAIN: Status: RESOLVED | Noted: 2019-08-29 | Resolved: 2019-11-12

## 2019-11-12 PROBLEM — R20.2 ARM PARESTHESIA, RIGHT: Status: RESOLVED | Noted: 2019-08-29 | Resolved: 2019-11-12

## 2019-11-12 NOTE — PROGRESS NOTES
Subjective:  HPI                    Objective:  System    Physical Exam    General     ROS    Assessment/Plan:    DISCHARGE REPORT    Progress reporting period is from 08/29/2019 to 11/12/2019.   Patient has not returned to therapy so current subjective and objective findings are unknown.  The subjective and objective information are from the last visit (09/13/2019) with this patient.    SUBJECTIVE  Subjective: Pt reports symptoms are less frequent.  Seems more predictable that is when driving, sitting at computer.   Current Pain level: (not rated numerically)   Initial Pain level: 5/10     OBJECTIVE  Objective: Tender to palpation R infraspinatus with reproduction of tingling down the arm.  Forward head and shoulders posture.  No change in symptoms cervical AROM all directions.      ASSESSMENT/PLAN  Updated problem list and treatment plan: Diagnosis 1:  Home program  STG/LTGs have been met or progress has been made towards goals:  N/A  Assessment of Progress: The patient has not returned to therapy. Current status is unknown.  Self Management Plans:  Patient has been instructed in a home treatment program.  Amer continues to require the following intervention to meet STG and LTG's: PT intervention is no longer required to meet STG/LTG.    Recommendations:  Pt last seen in PT 09/13/2019.  He has since cancelled without rescheduling and no further PT is scheduled.  Discharge to [facility].

## 2019-11-18 ENCOUNTER — RECORDS - HEALTHEAST (OUTPATIENT)
Dept: RADIOLOGY | Facility: CLINIC | Age: 44
End: 2019-11-18

## 2019-11-19 ENCOUNTER — HOSPITAL ENCOUNTER (OUTPATIENT)
Dept: PHYSICAL MEDICINE AND REHAB | Facility: CLINIC | Age: 44
Discharge: HOME | End: 2019-11-19
Attending: PHYSICAL MEDICINE & REHABILITATION

## 2019-11-19 DIAGNOSIS — M50.20 CERVICAL DISC HERNIATION: ICD-10-CM

## 2019-11-19 DIAGNOSIS — M54.6 PAIN IN THORACIC SPINE: ICD-10-CM

## 2019-11-19 DIAGNOSIS — G89.29 CHRONIC RIGHT SHOULDER PAIN: ICD-10-CM

## 2019-11-19 DIAGNOSIS — G56.01 CARPAL TUNNEL SYNDROME OF RIGHT WRIST: ICD-10-CM

## 2019-11-19 DIAGNOSIS — M54.2 NECK PAIN: ICD-10-CM

## 2019-11-19 DIAGNOSIS — M25.511 CHRONIC RIGHT SHOULDER PAIN: ICD-10-CM

## 2019-11-19 DIAGNOSIS — M54.12 CERVICAL RADICULITIS: ICD-10-CM

## 2019-11-19 ASSESSMENT — MIFFLIN-ST. JEOR: SCORE: 1644.69

## 2020-03-01 ENCOUNTER — HEALTH MAINTENANCE LETTER (OUTPATIENT)
Age: 45
End: 2020-03-01

## 2020-08-20 ENCOUNTER — TRANSFERRED RECORDS (OUTPATIENT)
Dept: HEALTH INFORMATION MANAGEMENT | Facility: CLINIC | Age: 45
End: 2020-08-20

## 2020-09-18 ENCOUNTER — AMBULATORY - HEALTHEAST (OUTPATIENT)
Dept: PHYSICAL MEDICINE AND REHAB | Facility: CLINIC | Age: 45
End: 2020-09-18

## 2020-09-18 ENCOUNTER — COMMUNICATION - HEALTHEAST (OUTPATIENT)
Dept: PHYSICAL MEDICINE AND REHAB | Facility: CLINIC | Age: 45
End: 2020-09-18

## 2020-09-18 DIAGNOSIS — M25.511 CHRONIC RIGHT SHOULDER PAIN: ICD-10-CM

## 2020-09-18 DIAGNOSIS — G89.29 CHRONIC RIGHT SHOULDER PAIN: ICD-10-CM

## 2020-10-13 ENCOUNTER — ANCILLARY PROCEDURE (OUTPATIENT)
Dept: MRI IMAGING | Facility: CLINIC | Age: 45
End: 2020-10-13
Attending: PHYSICAL MEDICINE & REHABILITATION
Payer: COMMERCIAL

## 2020-10-13 DIAGNOSIS — G89.29 CHRONIC RIGHT SHOULDER PAIN: ICD-10-CM

## 2020-10-13 DIAGNOSIS — M50.20 CERVICAL DISC HERNIATION: ICD-10-CM

## 2020-10-13 DIAGNOSIS — M54.12 CERVICAL RADICULITIS: ICD-10-CM

## 2020-10-13 DIAGNOSIS — M54.2 NECK PAIN: ICD-10-CM

## 2020-10-13 DIAGNOSIS — M25.511 CHRONIC RIGHT SHOULDER PAIN: ICD-10-CM

## 2020-10-13 PROCEDURE — 72141 MRI NECK SPINE W/O DYE: CPT | Mod: TC

## 2020-10-13 PROCEDURE — 73221 MRI JOINT UPR EXTREM W/O DYE: CPT | Mod: RT

## 2020-10-19 ENCOUNTER — RECORDS - HEALTHEAST (OUTPATIENT)
Dept: RADIOLOGY | Facility: CLINIC | Age: 45
End: 2020-10-19

## 2020-10-20 ENCOUNTER — COMMUNICATION - HEALTHEAST (OUTPATIENT)
Dept: PHYSICAL MEDICINE AND REHAB | Facility: CLINIC | Age: 45
End: 2020-10-20

## 2020-10-20 DIAGNOSIS — M54.2 NECK PAIN: ICD-10-CM

## 2020-10-20 DIAGNOSIS — M54.12 CERVICAL RADICULITIS: ICD-10-CM

## 2020-10-30 ENCOUNTER — THERAPY VISIT (OUTPATIENT)
Dept: PHYSICAL THERAPY | Facility: CLINIC | Age: 45
End: 2020-10-30
Payer: COMMERCIAL

## 2020-10-30 DIAGNOSIS — M54.12 CERVICAL RADICULOPATHY: ICD-10-CM

## 2020-10-30 PROCEDURE — 97162 PT EVAL MOD COMPLEX 30 MIN: CPT | Mod: GP | Performed by: PHYSICAL THERAPIST

## 2020-10-30 PROCEDURE — 97110 THERAPEUTIC EXERCISES: CPT | Mod: GP | Performed by: PHYSICAL THERAPIST

## 2020-10-30 NOTE — PROGRESS NOTES
"  [facility] Physical Therapy Initial Evaluation  10/30/2020     Mehreen/Sumit/MD instructions: ***    Therapist Assessment: Kenroy Garrido is a 45 year old male patient presenting to Physical Therapy with ***. Patient demonstrates ***. Signs and symptoms are consistent with ***. These impairments limit their ability to ***. Skilled PT services are necessary in order to reduce impairments and improve independent function.    Subjective History    Injury/Condition Details:  Presenting Complaint ***   Onset Timing/Date ***   Mechanism ***     Symptom Behavior Details    Primary Symptoms {JMsymptoms:101002}   Worst Pain ***/10 (with ***)   Symptom Provocators ***   Best Pain ***/10    Symptom Relievers ***   Time of day dependent? {sxtimin}   Recent symptom change? {jmsymptomchange:379045}     Prior Testing/Intervention for current condition:  Prior Tests  {jmpriortests:939550::\"x-ray\"}   Prior Treatment {jmprevioustreatment:090173}     MRI Findings:  Cervical:  IMPRESSION:    1. Degenerative changes in the cervical spine, most pronounced at  C5-C6 and C6-C7.  2. Right-sided neural foraminal narrowing is most pronounced at C6-C7  where there is moderate to severe right neural foraminal narrowing.  There is also moderate left neural foraminal narrowing at this level.  3. At C5-C6, there is moderate bilateral neural foraminal narrowing.  Shoulder:  Impression:  1. Small amount of subacromial and subdeltoid bursal fluid, which can  be seen with bursitis.  2. Tendinosis of the right shoulder subscapularis tendon. No  full-thickness tear or tendon retraction involving the rotator cuff  tendons.  3. Normal muscle bulk of the right shoulder rotator cuff musculature.  4. Normal-appearing biceps tendon.  5. Moderate degenerative changes of the acromioclavicular joint, with  a type III acromion.    Lifestyle & General Medical History:  Employment ***   Usual physical activities  (within past " year) ***   Orthopaedic History  ***   Medication  ***   Notable medical history See Epic Chart   Patient goals ***   Patient Reported Health {General health:826759}     *** Denies locking, catching, giving way, or instability. Denies numbness, tingling, changes in sensation. Denies having related symptoms spreading to the {hkradiation:713599}.     Red Flags: (Bold when present) - reviewed the following and denies  Vertebrobasilar Artery   Symptom   Dysphagia Drop Attack   Dysarthria Dizziness   Diplopia Paresthesia of lips   Spinal Cord  Symptom   Bi/Quadriparesthesia Ataxia   Hemiparesthesia Urinary incontinence   Bi/Quadriparesis Fecal incontinence     Cranial Nerves - bold when abnormality is present  Cranial nerve   II-[place] VIII-Loss of Balance   III-Diplopia VIII-Hypoacousia   V-Facial paresthesia IX-Dysarthria   VII-Altered Taste IX-Dysphagia    X-Nausea       PHYSICAL THERAPY CERVICAL EXAMINATION    Posture: ***   Cervical Spine ROM Screen   RIGHT LEFT   Cervical Spine ROM   Flexion ***   Extension ***   Rotation *** ***   Sidebend *** ***   Seated Thoracic Spine ROM   Rotation *** ***   Flexion *** ***   Extension *** ***     Passive Joint Mobility Screen: ***    Tender to palpation at the following structures: ***  NOT tender to palpation at the following structures: ***     SUSPECTED DIRECTIONAL PREFERENCE: ***     Upper Extremity Neural System Examination  Myotomes Strength (MMT)    L Pain? R Pain?   Resisted ABD (C5)  -  -   Resisted Elbow Flexion (C6)                 Wrist Extension  -  -    -  -   Resisted Elbow Extension (C7)                 Wrist Flexion    -      -     Resisted Thumb Extension (C8)  -  -   Resisted Intrinsics (T1)  -  -    Strength (#)  -  -     Sensory/Reflex Tests: SILT and symmetrical for bilateral UE's ***.   Right Left   Upper Limb Tension Testing     Medial {jmflexibility:116490} {jmflexibility:771704}   Ulnar {jmflexibility:954919} {jmflexibility:379278}   Radial  {jmflexibility:385457} {jmflexibility:453965}     Upper Extremity Flexibility Screen:   Right Left   Pec Major {jmflexibility:186229} {jmflexibility:113056}   Pec Minor {jmflexibility:790987} {jmflexibility:418322}   Upper Trap {jmflexibility:185611} {jmflexibility:084034}   Levator Scapula {jmflexibility:719067} {jmflexibility:992380}   Scalenes {jmflexibility:564052} {jmflexibility:154842}   SubOccipital {jmflexibility:791675} {jmflexibility:374690}   Erector Spinae  {jmflexibility:173809} {jmflexibility:583145}   - = normal  + = mild tightness  ++ = moderate tightness  +++ = significant tightness    Static Tests: ***  Alar Ligament test: ***  Transverse Ligament Test:***  Spurlings:***  Compression:***  Distraction:***    ASSESSMENT/PLAN:  ***  The patient is a 45 year old male with chief complaint of ***.    The patient has the following significant findings with corresponding treatment plan.  Diagnosis 1:  ***    Pain -  {PLAN PAIN:336399}  Decreased ROM/flexibility - manual therapy, therapeutic exercise and home program  Decreased joint mobility - manual therapy, therapeutic exercise and home program  Decreased strength - therapeutic exercise, therapeutic activities and home program  Impaired balance - neuro re-education, therapeutic activities and home program  Decreased proprioception - neuro re-education and therapeutic activities  Impaired gait - gait training and assistive devices  Impaired muscle performance - neuro re-education and home program  Decreased function - therapeutic activities and home program  Impaired posture - neuro re-education, therapeutic activities and home program  Instability -  Therapeutic Activity, Therapeutic Exercise, Neuromuscular Re-education, Splinting/Taping/Bracing/Orthotic, home program      Therapy Evaluation Codes:   1) History comprised of:   Personal factors that impact the plan of care:      {Personal factors impacting care:456111}.    Comorbidity factors that impact  "the plan of care are:      {Comorbidities impacting care:949054}.     Medications impacting care: {Medications Impacting care:320483}.  2) Examination of Body Systems comprised of:   Body structures and functions that impact the plan of care:      {Body Structures and functions:550907}.   Activity limitations that impact the plan of care are:      {Activity/participation limitations or restrictions:371824}.  3) Clinical presentation characteristics are:   {Clinical presentation characteristics:650332}.  4) Decision-Making    {Decision Making Low/Moderate/High:665754}  Cumulative Therapy Evaluation is: {Low/Moderate/High/Re-evaluation complexity:565453}.    Previous and current functional limitations:  (See Goal Flow Sheet for this information)    Short term and Long term goals: (See Goal Flow Sheet for this information)     Communication ability:  {COMMUNICATION ABILITY:172411::\"Patient appears to be able to clearly communicate and understand verbal and written communication and follow directions correctly.\"}  Treatment Explanation - The following has been discussed with the patient:   {RX EXPLANATION:826579::\"RX ordered/plan of care\",\"Anticipated outcomes\",\"Possible risks and side effects\"}  {RX PLANNED rehab:099717::\"This patient would benefit from PT intervention to resume normal activities.\"}   Rehab potential is {REHAB POTENTIAL/PROGNOSIS:744888}.    Frequency:  {FREQUENCY OF TREATMENT:769420}  Duration:  {DURATION OF TREATMENT:123857}  Discharge Plan: Achieve all LTGs, be independent in home treatment program, and reach maximal therapeutic benefit.    Please refer to the daily flowsheet for treatment today, total treatment time and time spent performing 1:1 timed codes.         "

## 2020-10-30 NOTE — PROGRESS NOTES
"  [facility] Physical Therapy Initial Evaluation  10/30/2020     Precautions/Restrictions/MD instructions: E&T (30 visits authorized)    Therapist Assessment: Kenroy Garrido is a 45 year old male patient presenting to Physical Therapy with right-sided neck pain. Patient demonstrates decreased function. Signs and symptoms are consistent with cervical radiculopathy. These impairments limit their ability to sleep, work, and drive. Skilled PT services are necessary in order to reduce impairments and improve independent function.    Subjective History  Can only sleep in one position. With driving, typing, when he slumps. Unable to sleep on left side. Sometimes the entire arm can be numb with tingling    Injury/Condition Details:  Presenting Complaint Pinched nerve in neck   Onset Timing/Date 2 years ago   Mechanism unknown     Symptom Behavior Details    Primary Symptoms Sporadic symptoms; Activity/position dependent   Worst Pain 8/10 (with poor posture, driving, sleeping)   Symptom Provocators Sleeping on left side (because it side bends his neck to the right), raising arm, tilting head to right side   Best Pain 0/10 (he rarely feels no symptoms whatsoever - always numb at least in right index)   Symptom Relievers Stretching neck to left side   Time of day dependent? No   Recent symptom change? symptoms improving (since he has learned how to start managing)     Prior Testing/Intervention for current condition:  Prior Tests  MRI   Prior Treatment PT; was \"useless\" because they were focusing on shoulder      MRI Findings:  Cervical:  IMPRESSION:    1. Degenerative changes in the cervical spine, most pronounced at  C5-C6 and C6-C7.  2. Right-sided neural foraminal narrowing is most pronounced at C6-C7  where there is moderate to severe right neural foraminal narrowing.  There is also moderate left neural foraminal narrowing at this level.  3. At C5-C6, there is moderate bilateral neural foraminal " narrowing.  Shoulder:  Impression:  1. Small amount of subacromial and subdeltoid bursal fluid, which can  be seen with bursitis.  2. Tendinosis of the right shoulder subscapularis tendon. No  full-thickness tear or tendon retraction involving the rotator cuff  tendons.  3. Normal muscle bulk of the right shoulder rotator cuff musculature.  4. Normal-appearing biceps tendon.  5. Moderate degenerative changes of the acromioclavicular joint, with  a type III acromion.    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    Red Flags: (Bold when present) - reviewed the following and denies  Vertebrobasilar Artery   Symptom   Dysphagia Drop Attack   Dysarthria Dizziness   Diplopia Paresthesia of lips   Spinal Cord  Symptom   Bi/Quadriparesthesia Ataxia   Hemiparesthesia Urinary incontinence   Bi/Quadriparesis Fecal incontinence     Cranial Nerves - bold when abnormality is present  Cranial nerve   II-[place] VIII-Loss of Balance   III-Diplopia VIII-Hypoacousia   V-Facial paresthesia IX-Dysarthria   VII-Altered Taste IX-Dysphagia    X-Nausea       PHYSICAL THERAPY CERVICAL EXAMINATION    Posture: rounded shoulders  Cervical Spine ROM Screen   RIGHT LEFT   Cervical Spine ROM   Flexion No restriction (-) symptoms   Extension No restriction (-) symptoms   Rotation No restriction (+) symptoms down arm No restriction (-) symptoms   Sidebend No restriction (+) symptoms down arm No restriction (-) symptoms   Seated Thoracic Spine ROM   Rotation No restriction No restriction   Flexion No restriction No restriction   Extension limited      Passive  Joint Mobility Screen: hypomobile C5-T7    Tender to palpation at the following structures: C6-C7 transverse processes  NOT tender to palpation at the following structures: neck musculature     SUSPECTED DIRECTIONAL PREFERENCE: extension     Upper Extremity Neural System Examination  Myotomes Strength (MMT)    L Pain? R Pain?   Resisted ABD (C5) 5 - 4 +   Resisted Elbow Flexion (C6)                 Wrist Extension 5 -  - 4   +     Resisted Elbow Extension (C7)                 Wrist Flexion 5   -   4   +     Resisted Thumb Extension (C8) 5 - 5 -   Resisted Intrinsics (T1) 5 - 5 -    Strength (#) 5 - 5 -     Sensory/Reflex Tests: SILT and symmetrical for bilateral UE's.   Right Left   Upper Limb Tension Testing     Medial ++ ++   Ulnar - -   Radial ++ ++     Upper Extremity Flexibility Screen:   Right Left   Pec Major - -   Pec Minor - -   Upper Trap - -   Levator Scapula - -   Scalenes - -   SubOccipital - -   Erector Spinae  - -   - = normal  + = mild tightness  ++ = moderate tightness  +++ = significant tightness    Static Tests:   Spurlings:+  Compression:+  Distraction:+    ASSESSMENT/PLAN:    The patient is a 45 year old male with chief complaint of neck pain with numbness/tingling/pain into right arm.    The patient has the following significant findings with corresponding treatment plan.  Diagnosis 1:  Cervical radiculopathy    Pain -  hot/cold therapy, electric stimulation, mechanical traction, manual therapy, self management, education, directional preference exercise and home program  Decreased ROM/flexibility - manual therapy, therapeutic exercise and home program  Decreased joint mobility - manual therapy, therapeutic exercise and home program  Decreased strength - therapeutic exercise, therapeutic activities and home program  Impaired balance - neuro re-education, therapeutic activities and home program  Decreased proprioception - neuro re-education and therapeutic activities  Impaired gait - gait  training and assistive devices  Impaired muscle performance - neuro re-education and home program  Decreased function - therapeutic activities and home program  Impaired posture - neuro re-education, therapeutic activities and home program  Instability -  Therapeutic Activity, Therapeutic Exercise, Neuromuscular Re-education, Splinting/Taping/Bracing/Orthotic, home program      Therapy Evaluation Codes:   1) History comprised of:   Personal factors that impact the plan of care:      Time since onset of symptoms.    Comorbidity factors that impact the plan of care are:      None.     Medications impacting care: Anti-inflammatory.  2) Examination of Body Systems comprised of:   Body structures and functions that impact the plan of care:      Cervical spine, Shoulder and Thoracic Spine.   Activity limitations that impact the plan of care are:      Driving, Working and Sleeping.  3) Clinical presentation characteristics are:   Evolving/Changing.  4) Decision-Making    Moderate complexity using standardized patient assessment instrument and/or measureable assessment of functional outcome.  Cumulative Therapy Evaluation is: Moderate complexity.    Previous and current functional limitations:  (See Goal Flow Sheet for this information)    Short term and Long term goals: (See Goal Flow Sheet for this information)     Communication ability:  Patient appears to be able to clearly communicate and understand verbal and written communication and follow directions correctly.  Treatment Explanation - The following has been discussed with the patient:   RX ordered/plan of care  Anticipated outcomes  Possible risks and side effects  This patient would benefit from PT intervention to resume normal activities.   Rehab potential is good.    Frequency:  1 X week, once daily  Duration:  for 6 weeks  Discharge Plan: Achieve all LTGs, be independent in home treatment program, and reach maximal therapeutic benefit.    Please refer to the  daily flowsheet for treatment today, total treatment time and time spent performing 1:1 timed codes.

## 2020-11-06 ENCOUNTER — HOSPITAL ENCOUNTER (OUTPATIENT)
Dept: PHYSICAL MEDICINE AND REHAB | Facility: CLINIC | Age: 45
Discharge: HOME | End: 2020-11-06
Attending: PHYSICAL MEDICINE & REHABILITATION

## 2020-11-06 ENCOUNTER — THERAPY VISIT (OUTPATIENT)
Dept: PHYSICAL THERAPY | Facility: CLINIC | Age: 45
End: 2020-11-06
Payer: COMMERCIAL

## 2020-11-06 DIAGNOSIS — M54.2 NECK PAIN: ICD-10-CM

## 2020-11-06 DIAGNOSIS — M50.20 CERVICAL DISC HERNIATION: ICD-10-CM

## 2020-11-06 DIAGNOSIS — M54.12 CERVICAL RADICULOPATHY: ICD-10-CM

## 2020-11-06 DIAGNOSIS — G56.01 CARPAL TUNNEL SYNDROME OF RIGHT WRIST: ICD-10-CM

## 2020-11-06 DIAGNOSIS — M54.12 CERVICAL RADICULITIS: ICD-10-CM

## 2020-11-06 PROCEDURE — 97110 THERAPEUTIC EXERCISES: CPT | Mod: GP | Performed by: PHYSICAL THERAPIST

## 2020-11-06 PROCEDURE — 97140 MANUAL THERAPY 1/> REGIONS: CPT | Mod: GP | Performed by: PHYSICAL THERAPIST

## 2020-11-20 ENCOUNTER — THERAPY VISIT (OUTPATIENT)
Dept: PHYSICAL THERAPY | Facility: CLINIC | Age: 45
End: 2020-11-20
Payer: COMMERCIAL

## 2020-11-20 DIAGNOSIS — M54.12 CERVICAL RADICULOPATHY: ICD-10-CM

## 2020-11-20 PROCEDURE — 97140 MANUAL THERAPY 1/> REGIONS: CPT | Mod: GP | Performed by: PHYSICAL THERAPIST

## 2020-11-20 PROCEDURE — 97110 THERAPEUTIC EXERCISES: CPT | Mod: GP | Performed by: PHYSICAL THERAPIST

## 2020-11-20 NOTE — PROGRESS NOTES
SUBJECTIVE  Subjective changes as noted by pt:  Feels the same. Tried Gabapentin which didn't help. Goes back and forth between having good days and bad days.     Current pain level: 0/10     Changes in function:  None     Adverse reaction to treatment or activity:  treatment - shoulder/neck motion increases numbness/tingling symptoms, activity - shoulder/neck motion increases numbness/tingling symptoms    OBJECTIVE  Changes in objective findings:  Yes, no numbness tingling to start except for in tip of finger. All motions of head/neck increased symptoms. Manual traction with head in approx 15deg flexion alleviates symptoms. When he sits upright and extends his shoulder it relieves symptoms.         ASSESSMENT  Amer continues to require intervention to meet STG and LTG's: PT  No change of symptoms has been noted.  Response to therapy has shown lack of progress in  radicular symptoms, strength, muscle control, posture and function  Progress made towards STG/LTG?  None    PLAN  Continue current treatment plan until patient demonstrates readiness to progress to higher level exercises.    PTA/ATC plan:  N/A    Please refer to the daily flowsheet for treatment today, total treatment time and time spent performing 1:1 timed codes.

## 2020-12-03 ENCOUNTER — THERAPY VISIT (OUTPATIENT)
Dept: PHYSICAL THERAPY | Facility: CLINIC | Age: 45
End: 2020-12-03
Payer: COMMERCIAL

## 2020-12-03 DIAGNOSIS — M54.12 CERVICAL RADICULOPATHY: ICD-10-CM

## 2020-12-03 PROCEDURE — 97110 THERAPEUTIC EXERCISES: CPT | Mod: GP | Performed by: PHYSICAL THERAPIST

## 2020-12-03 PROCEDURE — 97012 MECHANICAL TRACTION THERAPY: CPT | Mod: GP | Performed by: PHYSICAL THERAPIST

## 2020-12-03 NOTE — PROGRESS NOTES
SUBJECTIVE  Subjective changes as noted by pt:  Once the nerve get aggravated it takes. 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 Has been forgetting to take anti-inflammatory medication because it has been feeling so good. Hasn't kept up with HEP and has focused on finding non-provocative positions. Feels like he has full range and full strength.      Current pain level: 0/10     Changes in function:  Yes (See Goal flowsheet attached for changes in current functional level)     Adverse reaction to treatment or activity:  None    OBJECTIVE  Changes in objective findings:  Yes, mechanical traction decreased symptoms. Pt reports he hadn't felt this good in years.         ASSESSMENT  Amer continues to require intervention to meet STG and LTG's: PT  Pt is doing well because he has modified his activities to not provoke symptoms.   Response to therapy has shown an improvement in  function  Progress made towards STG/LTG?  Yes (See Goal flowsheet attached for updates on achievement of STG and LTG)    PLAN  Continue current treatment plan until patient demonstrates readiness to progress to higher level exercises.    PTA/ATC plan:  N/A    Please refer to the daily flowsheet for treatment today, total treatment time and time spent performing 1:1 timed codes.

## 2020-12-11 ENCOUNTER — THERAPY VISIT (OUTPATIENT)
Dept: PHYSICAL THERAPY | Facility: CLINIC | Age: 45
End: 2020-12-11
Payer: COMMERCIAL

## 2020-12-11 ENCOUNTER — TELEPHONE (OUTPATIENT)
Dept: PHYSICAL THERAPY | Facility: CLINIC | Age: 45
End: 2020-12-11

## 2020-12-11 DIAGNOSIS — M54.12 CERVICAL RADICULOPATHY: ICD-10-CM

## 2020-12-11 DIAGNOSIS — Z53.9 ERRONEOUS ENCOUNTER--DISREGARD: Primary | ICD-10-CM

## 2020-12-11 NOTE — TELEPHONE ENCOUNTER
"Pt was scheduled for a virtual visit, but he was interested in discussing his options for a home cervical traction unit. Per previous treatment we discussed him either continuing to come to clinic for traction vs buying traction unit for home. He decided at this time to opt for the latter. He stated that \"the traction worked better than anything else that we tried.\" Had relief for 2-3 days after. Even now he is still feeling better than before trying mechanical traction. Recommended he check with his insurance company to see if they would cover DME. Recommended [facility]. No skilled services administered. No charge.   "

## 2020-12-14 ENCOUNTER — HEALTH MAINTENANCE LETTER (OUTPATIENT)
Age: 45
End: 2020-12-14

## 2020-12-21 ENCOUNTER — COMMUNICATION - HEALTHEAST (OUTPATIENT)
Dept: PHYSICAL MEDICINE AND REHAB | Facility: CLINIC | Age: 45
End: 2020-12-21

## 2020-12-23 ENCOUNTER — AMBULATORY - HEALTHEAST (OUTPATIENT)
Dept: PHYSICAL MEDICINE AND REHAB | Facility: CLINIC | Age: 45
End: 2020-12-23

## 2020-12-23 ENCOUNTER — COMMUNICATION - HEALTHEAST (OUTPATIENT)
Dept: PHYSICAL MEDICINE AND REHAB | Facility: CLINIC | Age: 45
End: 2020-12-23

## 2020-12-23 DIAGNOSIS — M54.12 CERVICAL RADICULITIS: ICD-10-CM

## 2021-04-17 ENCOUNTER — HEALTH MAINTENANCE LETTER (OUTPATIENT)
Age: 46
End: 2021-04-17

## 2021-06-03 NOTE — PATIENT INSTRUCTIONS - HE
An MRI of your neck is ordered today.  Someone will call you to schedule this.  You can either schedule this right away or you can wait until January 2020.  Dr. Carrasco will contact you with the results of the MRI.    An EMG of your right arm/hand is ordered today.  You can schedule this for right away or you can wait until January 2020.  The EMG will be done at this office with Dr. Umana.    Nabumetone (which is an anti-inflammatory) medication is prescribed today.  Take 1 tablet 3 times a day as needed for pain.  This medication should be taken with food and water to prevent any stomach upset.  Do not take ibuprofen/Advil/Motrin/Aleve/naproxen while you take Nabumetone.  Please call if you have any side effects to  Nabumetone.      Gabapentin (nerve pain medication) was prescribed today.  Please use the chart to increase the dose to an amount that controls your pain (do not exceed 3 tablets three times a day).  If you have any questions on how to increase the dose or any side effects to this medication, please call the clinic.    Gabapentin 300mg Dosing Chart    DATE  MORNING AFTERNOON BEDTIME    Day 1 0 0 1    Day 2 0 0 1    Day 3 0 0 1    Day 4 1 0 1    Day 5 1 0 1    Day 6 1 0 1    Day 7 1 1 1    Day 8 1 1 1    Day 9 1 1 1    Day 10 1 1 2    Day 11 1 1 2    Day 12 1 1 2    Day 13 2 1 2    Day 14 2 1 2    Day 15 2 1 2    Day 16 2 2 2    Day 17 2 2 2    Day 18 2 2 2    Day 19 2 2 3    Day 20 2 2 3    Day 21 2 2 3    Day 22 3 2 3    Day 23 3 2 3    Day 24 3 2 3    Day 25 3 3 3    Day 26 3 3 3    Day 27 3 3 3     Continue medication, taking 3 capsules three times daily    Please call if you have any questions regarding how to take your medication  Clinic Phone # [phone]    Amer - please don't hesitate to reach out with any questions/concerns..

## 2021-06-03 NOTE — PROGRESS NOTES
ASSESSMENT: Kenroy Garrido is a 44 y.o. male  with a BMI of 23.68 with past medical history significant for mild GERD who presents today for new patient evaluation of chronic right-sided neck pain with radiation going down into the right arm.  This radiation into the right arm is likely from cervical radiculitis, likely from a cervical disc herniation.  Patient does have a diminished right triceps reflex.  The patient may have an element of right shoulder pain, and he does have a history of chronic right shoulder pain.  Patient does have pain in the right thoracic spine and in the right low back, though these are likely referred from the neck and arm pain.  The patient may have an element of carpal tunnel syndrome that is also contributing to his symptoms.  He did have positive carpal tunnel provocative maneuvers on exam today.  Other than the diminished right triceps reflex he does have some impaired sensation in the right thumb and index fingers compared to the left side.  He is without any red flag symptoms.    NDI:  2%  WHO-5:  20  JOHNNA: 16%    PLAN:  A shared decision making model was used.  The patient's values and choices were respected.  The following represents what was discussed and decided upon by the physician and the patient.      1.  DIAGNOSTIC TESTS:    -The patient's x-ray of the right shoulder was personally reviewed today by the physician with the physician performing her own interpretation.  The patient's x-ray of the right shoulder is largely normal.  There is no obvious fracture or dislocation.  -An MRI of the cervical spine is ordered today to look for disc herniation and cervical foraminal stenosis that could be causing his symptoms.  -An EMG of the right upper extremity is ordered today to look for cervical radiculopathy.  He may have a concomitant carpal tunnel syndrome and/or cubital tunnel syndrome.  -Discussed with patient that he can have these tests right away or he could wait until  January 2020 depending on what his financial situation is regarding his insurance.  2.  PHYSICAL THERAPY: Patient has gone to physical therapy but is focused mainly on the shoulder.  We will hold off on further physical therapy until the results of his tests are known.  He may benefit from more physical therapy that is specifically targeted for the neck and potentially the diagnosis of a cervical disc herniation.  3.  MEDICATIONS:    -Nabumetone 500 mg 1 tablet up to every 8 hours as needed for pain as prescribed today.  He should take this medication with food/water to prevent any stomach upset.  He should refrain from taking any over-the-counter NSAIDs while he takes nabumetone.  -Discussed Gabapentin/Neurontin (nerve pain medication) mechanism of action as well as potential side effects (drowsiness/dizziness) with the patient.  The patient wanted to try this medication so Gabapentin 300 mg was prescribed.  A chart was given with how to increase the dose to a maximum of 3 tablets three times a day.  The lowest therapeutic dose should be used.   The patient is asked to call the clinic if there are any side effects to the Gabapentin or if questions arise as to how to increase the dose.  4.  INTERVENTIONS: Injections were not discussed today as the diagnosis is not confirmed at this time.  Injections can be considered in the future once a diagnosis is established and he has failed conservative treatment for these diagnoses.  5.  PATIENT EDUCATION:   -All the patient's questions were answered to his satisfaction today.  He was in agreement with the treatment plan.  6.  FOLLOW-UP:   Staff will call him with the results of the MRI and the EMG and then further recommendations can be made.  If there are any questions/concerns or any significant worsening of pain prior to that time, the patient is asked to call the clinic via the nurse navigation line or via [employer].         SUBJECTIVE:  Kenroy Garrido  Is a 44 y.o.  "male who presents today for new patient evaluation of neck pain on the right side with pain going into the right arm.  Patient also has right thoracic spine pain as well as right low back pain, but the main pain is in the right arm.  He reports that he has had the symptoms for the last year, but it has significant leg worse in the last 6 months or so.  The patient describes the pain in the neck is more of a nagging pain or a tightness.  This is more tolerable as opposed to the pain in the arm.  He states that the arm feels like it is \"going to fall off.\"  He does get numbness and tingling in the arm as well.  It tends to concentrate in the extensor forearm and then goes into the right index finger and the thumb.  He has not noticed any weakness though.  He denies any symptoms in the left side.  He did try taking prednisone about a month ago.  It did help a little bit with the pain but it did not help at all with the numbness.  He is tried some Aleve and he thinks that initially it did provide some mild relief but now he reports that it does not provide any relief whatsoever.  He did go to physical therapy which was mainly for the shoulder.  This did not seem to help at all.  Is not tried any other treatment.  He denies any symptoms on the left side.    At this time his symptoms are worse with touching certain areas in his thoracic spine (can reproduce the numbness and sitting, particularly if he has his laptop in a certain position can increase his symptoms.  Carrying things or driving can also aggravate the symptoms.  The position of his head/neck does not seem to correlate with increase in pain/numbness.  He does feel little bit better with holding his arm in a certain position, usually when his shoulder is extended.  Certain postures tend to help alleviate pain.  Stretching does not seem to be as helpful.    Medications:  Reviewed and correct in the chart.     Allergies: Reviewed and NKDA per patient.    PMH:  " Reviewed and confirm mild GERD, for which he does not usually take medications (usually wrist sprain.    PSH:  Reviewed and he denies any previous surgeries.    Family History:  Reviewed and significant for colon cancer in his mother.    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  He drinks alcohol on average about once per week.  He denies any tobacco or illicit drug use.    ROS: Positive for GERD, muscle pain, muscle fatigue, joint pain.  Specifically negative for bowel/bladder retention, dysphagia, imbalance/falls, difficulty with fine motor skills or headaches.  Otherwise 13 systems reviewed are negative.  Please see the patient's intake questionnaire from today for details.      OBJECTIVE:  PHYSICAL EXAMINATION:  CONSTITUTIONAL:  Vital signs as above.  No acute distress.  The patient is well nourished and well groomed.  PSYCHIATRIC:  The patient is awake, alert, oriented to person, place, time and answering questions appropriately with clear speech.    HEENT:  Sclera are non-injected.  Extraocular muscles are intact. .  Moist oral mucosa.  SKIN:  Skin over the face, bilateral upper extremities, and posterior torso is clean, dry, intact without rashes.  LYMPH NODES:  No palpable or tender anterior/posterior cervical, submandibular, or supraclavicular lymph nodes.    MUSCLE STRENGTH:  5/5 strength for the bilateral shoulder abductors, elbow flexors/extensors, wrist extensors, finger flexors/abductors.  NEURO:   The patient has a 1/4 right triceps reflex but a 2/4 left triceps reflex.  2/4 biceps and brachioradialis reflexes that are symmetric bilaterally.  Sensation to pinprick is impaired in the right thumb and index finger and more mildly impaired in the right middle finger compared to these digits on the left hand.  Sensation to pin prick is intact in the bilateral ring and pinky fingers. Negative Murrell's bilaterally.    VASCULAR:  2/4 radial pulses bilaterally.  Warm upper  limbs bilaterally.  Capillary refill in the upper extremities is less than 1 second.  MUSCULOSKELETAL: The patient has largely normal range of motion of the cervical spine with flexion and extension.  He does report pain in the right side of his neck with both flexion and extension.  With extension he also has increased numbness and tingling.  The patient does have pain with right cervical sidebending and less pain with left cervical sidebending.  No significant pain with bilateral cervical rotation.  Spurling's test is positive on the right side for reproduction of pain in the neck, thoracic spine and going down the right arm.  Spurling's is in the left side does increase numbness and tingling in the right arm.  Patient has largely normal range of motion of the bilateral shoulders with active range of motion testing with flexion, extension, abduction, internal/external rotation.  He does have a mild amount of pain with internal rotation, but the other motions do not significantly aggravate the shoulder pain.  Positive empty can sign on the right side.  Positive Neer's test on the right side, but it does not produce as much pain as the empty can test on the right side.  Sharma test is essentially negative on the right side.  Positive carpal compression test on the right side for increased numbness and tingling in the right hand.  Positive Tinel's at the right wrist and at the right elbow.  Negative Tinel's at the left wrist.

## 2021-06-04 VITALS — BODY MASS INDEX: 23.62 KG/M2 | HEIGHT: 70 IN | WEIGHT: 165 LBS

## 2021-06-12 NOTE — TELEPHONE ENCOUNTER
Nurse navigation to call the patient and let him know his MRI of the cervical spine showed a pinched nerve at the right C6-7 level.  There is moderate foraminal stenosis at the C5-6 level (bilaterally) as well but the worst level appears to be the right C6-7 level.  Please let him know his MRI of the shoulder showed some mild bursitis and degenerative changes .  There were no tears or other major findings.  His pain generator is most likely the pinched nerve in his neck.    Please check status and relay information back to physician.

## 2021-06-12 NOTE — PROGRESS NOTES
Patient presents at the request of Dr. Nicole Carrasco for a right upper extremity EMG.  He has right-sided neck pain with pain numbness and tingling down the right arm to digit 2 for the past 2 years.  He is right-handed.  On exam normal sensation and strength in the right upper extremity.  No hyperreflexia.    EMG/NCS  results: Please see scanned full report    Comment NCS: Normal study  1.  Normal nerve conduction studies right upper extremity.    Comment EMG: Normal study  1.  Normal needle EMG right upper extremity.    Interpretation: Normal study    1. There is no electrodiagnostic evidence of cervical radiculopathy, brachial plexopathy, or focal neuropathy in the right upper extremity.  Specifically, there is no electrodiagnostic evidence of median neuropathy at the wrist or a C6/7 radiculopathy in the right upper extremity.         The testing was completed in its entirety by the physician.       It was our pleasure caring for your patient today, if there any questions or concerns please do not hesitate to contact us.

## 2021-06-12 NOTE — TELEPHONE ENCOUNTER
"Call placed to pt with provider's results and recommendations. Pt stated understanding. Pt reports no change in his symptoms since he was last seen in 11/2019.  He has right-sided neck pain that radiates down his right arm. \"Nothing has changed really. It's been pretty consistent. I do feel more aware of it though. I can now recognize when I start hurting and adjust my position and I avoid the bad positions that cause me more pain, but that's not a solution\".     Pt reports he is no longer taking the prescribed nabumetone and gabapentin. He reports \"I did not do well with them\" and adds that they did not provide him with any relief.     Pt inquiring about next steps in his treatment plan.     Informed pt that provider will be updated with his status and he will be called back with further recommendations. Ok to leave detailed message upon call back.       "

## 2021-06-12 NOTE — TELEPHONE ENCOUNTER
At the visit in November, an EMG of the right upper extremity was ordered.  Please encourage patient to schedule this as this will be helpful in determining if there is nerve damage and a concomitant carpal tunnel syndrome.   Also, would recommend he do physical therapy for the neck and pinched nerve (previously PT focused on shoulder).  Can offer Sulindac 150 mg po two times a day PRN pain, #60 tabs with 3 refills.  If he fails to have relief with PT for the neck, then an epidural steroid injection could be considered.  Please offer patient video visit follow-up if he would like to discuss things further.  Otherwise can proceed with above recommendations and then nurse navigation to call in 4 weeks.

## 2021-06-12 NOTE — TELEPHONE ENCOUNTER
Pt returned call. Provider's response given. Pt stated understanding.     Pt would like to proceed with PT. He would like to go through [facility] at the [place] location.     Pt also would like to try medication. Order prepped and ready for provider review/edit.     Transferred pt to scheduling to make EMG appt.     Provider to address: Please approve medication order. Please place PT referral (order started but needs dx and instructions).

## 2021-06-13 NOTE — TELEPHONE ENCOUNTER
Follow up call placed to check on status 8 weeks after referred to PT. Left message to return call.     *Per telephone conversation on 10/20/20 he was not taking the Gabapentin.

## 2021-06-13 NOTE — TELEPHONE ENCOUNTER
Nurse navigation to please call to update status.  Patient can be offered a right C6-7 TFESI for severe pain if he is not having relief with PT and medications (including Gabapentin).

## 2021-06-14 NOTE — TELEPHONE ENCOUNTER
Call to pt in follow-up. Pt reports cervical traction has been very helpful in controlling his symptoms. He also works on avoiding situations that would cause his pain to flare up. Pt declines injection at this time.     He inquires about order for cervical traction unit. Upon chart review, order was placed for this on 12/23. Pt stated understanding.

## 2021-06-15 ENCOUNTER — AMBULATORY - HEALTHEAST (OUTPATIENT)
Dept: PHYSICAL MEDICINE AND REHAB | Facility: CLINIC | Age: 46
End: 2021-06-15

## 2021-06-15 DIAGNOSIS — M54.12 CERVICAL RADICULITIS: ICD-10-CM

## 2021-06-21 NOTE — LETTER
Letter by Nicole Licea DO at      Author: Nicole Licea DO Service: -- Author Type: --    Filed:  Encounter Date: 12/23/2020 Status: (Other)         December 23, 2020     Patient: Kenroy Garrido   YOB: 1975   Date of Visit: 12/23/2020       To Whom It May Concern:    It is my medical opinion that Kenroy Garrido would benefit from a cervical traction home unit.  Diagnosis is neck pain with cervical radiculitis from cervical foraminal stenosis..    If you have any questions or concerns, please don't hesitate to call.    Sincerely,        Electronically signed by Nicole Quiroga DO

## 2021-06-26 ENCOUNTER — THERAPY VISIT (OUTPATIENT)
Dept: PHYSICAL THERAPY | Facility: CLINIC | Age: 46
End: 2021-06-26
Payer: COMMERCIAL

## 2021-06-26 DIAGNOSIS — M54.12 CERVICAL RADICULOPATHY: Primary | ICD-10-CM

## 2021-06-26 PROCEDURE — 97110 THERAPEUTIC EXERCISES: CPT | Mod: GP | Performed by: PHYSICAL THERAPIST

## 2021-06-26 PROCEDURE — 97161 PT EVAL LOW COMPLEX 20 MIN: CPT | Mod: GP | Performed by: PHYSICAL THERAPIST

## 2021-06-26 PROCEDURE — 97012 MECHANICAL TRACTION THERAPY: CPT | Mod: GP | Performed by: PHYSICAL THERAPIST

## 2021-06-26 NOTE — LETTER
[facility]  [address]  [address]  [place] [address]  [phone]    2021    Re: Kenroy Garrido   :   1975  MRN:  [id number]   REFERRING PHYSICIAN:   Nicole WEN [facility] [place]    Date of Initial Evaluation:  2021  Visits:  Rxs Used: 1  Reason for Referral:  Cervical radiculopathy    EVALUATION SUMMARY      [facility] Initial Evaluation  2021     Precautions/Restrictions/MD instructions: eval and treat     Therapist Assessment: Kenroy Garrido is a 46 year old male patient presenting to Physical Therapy with R sided cervical pain. Patient demonstrates decreased cervical AROM for extension, R side bend, and R rotation, positive spurling's on R, and decreased symptoms with distraction. Patient has had therapy in past, and found that only thing to relieve symptoms has been mechanical traction and came back to PT for more mechanical traction. Patient had home traction unit at home, but states it did not bring the same relief as the traction unit in clinic. Signs and symptoms are consistent with R cervical radiculopathy. These impairments limit their ability to turn neck, lift arm, and drive without pain. Skilled PT services are necessary in order to reduce impairments and improve independent function.    Subjective History    Injury/Condition Details:  Presenting Complaint Cervical neck pain    Onset Timing/Date 2 years ago, (Doctor's referral 6/15/2021)   Mechanism Insidious onset      Symptom Behavior Details    Primary Symptoms Sporadic symptoms; Activity/position dependent, pain (Location: R side neck C3-C7. numbness , Quality: Sharp and Aching/Throbbing)        Denies locking, catching, giving way, or instability.     Numbness, tingling, changes in sensation into R fingers.     Denies having related symptoms spreading to the R upper arm and R  forearm.    Worst Pain 8/10 (with driving)   Symptom Provocators Driving, sitting wrong, sleeping, computer work    Best Pain 2/10    Symptom Relievers Adjusting neck, stretching (patient doing his own stretching, stopped doing PT exercises due to feeling that these exacerbated pain), ibuprofen    Time of day dependent? No   Recent symptom change? no change in symptoms     Prior Testing/Intervention for current condition:  Prior Tests  MRI   Prior Treatment PT, cervical traction      Lifestyle & General Medical History:  Employment Sales - computer or driving    Usual physical activities  (within past year) Play volleyball, biking, golf   Orthopaedic History  None reported by patient    Medication  None reported by patient    Notable medical history None reported by patient    Patient goals Decrease numbness and tingling in R hand    Patient Reported Health good     Red Flags: (Bold when present) - reviewed the following and denies  Vertebrobasilar Artery   Symptom   Dysphagia Drop Attack   Dysarthria Dizziness   Diplopia Paresthesia of lips   Spinal Cord  Symptom   Bi/Quadriparesthesia Ataxia   Hemiparesthesia Urinary incontinence   Bi/Quadriparesis Fecal incontinence     Cranial Nerves - bold when abnormality is present  Cranial nerve   II-Scotoma VIII-Loss of Balance   III-Diplopia VIII-Hypoacousia   V-Facial paresthesia IX-Dysarthria   VII-Altered Taste IX-Dysphagia    X-Nausea       PHYSICAL THERAPY CERVICAL EXAMINATION    STATIC POSTURE  Forward head on neck ,patient naturally sitting with  Increased thoracic kyphosis and Rounded shoulders    Cervical Spine ROM Screen   RIGHT LEFT   Cervical Spine ROM   Flexion Nil loss    Extension Mod loss    Rotation Mod loss Nil loss    Sidebend Mod loss  Nil loss    Seated Thoracic Spine ROM   Rotation Nil loss  Nil loss    Flexion Nil loss  Nil loss    Extension Mod loss Mod loss     Passive Joint Mobility Screen: increased stiffness R C4-C5     Tender to palpation at  the following structures: R upper trap, R levator   NOT tender to palpation at the following structures: L upper trap      SUSPECTED DIRECTIONAL PREFERENCE: extension      Upper Extremity Neural System Examination  Myotomes Strength (MMT)    Left Pain Right Pain   Resisted ABD (C5) 5/5 - 5/5 -   Resisted Elbow Flexion (C6)                 Wrist Extension 5/5 -  - 5/5 -  -   Resisted Elbow Extension (C7)                 Wrist Flexion 5/5   -   5/5   -     Resisted Thumb Extension (C8) 5/5 - 5/5 -   Resisted Intrinsics (T1) 5/5 - 5/5 -   Sensory/Reflex Tests: SILT and symmetrical for bilateral UE's.       Right Left   Upper Limb Tension Testing     Medial - -   Ulnar - -   Radial ++ -   - = normal  + = mild tightness  ++ = moderate tightness  +++ = significant tightness        Static Tests:   Alar Ligament test: negative  Transverse Ligament Test:negative  Spurlings: negative  Compression:negative  Distraction:negative     ASSESSMENT/PLAN:  The patient is a 46 year old male with chief complaint of R cervical pain.    The patient has the following significant findings with corresponding treatment plan.  Diagnosis 1:  R cervical radiculopathy     Pain -  mechanical traction, manual therapy, self management, education, directional preference exercise and home program  Decreased ROM/flexibility - manual therapy, therapeutic exercise and home program  Decreased joint mobility - manual therapy, therapeutic exercise and home program  Decreased strength - therapeutic exercise, therapeutic activities and home program  Impaired balance - neuro re-education, therapeutic activities and home program  Decreased proprioception - neuro re-education and therapeutic activities  Impaired gait - gait training and assistive devices  Impaired muscle performance - neuro re-education and home program  Decreased function - therapeutic activities and home program  Impaired posture - neuro re-education, therapeutic activities and home  program  Instability -  Therapeutic Activity, Therapeutic Exercise, Neuromuscular Re-education, Splinting/Taping/Bracing/Orthotic, home program      Therapy Evaluation Codes:   1) History comprised of:   Personal factors that impact the plan of care:      Time since onset of symptoms.    Comorbidity factors that impact the plan of care are:      None.     Medications impacting care: None.  2) Examination of Body Systems comprised of:   Body structures and functions that impact the plan of care:      Cervical spine.   Activity limitations that impact the plan of care are:      Driving, Reading/Computer work and Sitting.  3) Clinical presentation characteristics are:   Stable/Uncomplicated.  4) Decision-Making    Low complexity using standardized patient assessment instrument and/or measureable assessment of functional outcome.  Cumulative Therapy Evaluation is: Low complexity.    Previous and current functional limitations:  (See Goal Flow Sheet for this information)    Short term and Long term goals: (See Goal Flow Sheet for this information)     Communication ability:  Patient appears to be able to clearly communicate and understand verbal and written communication and follow directions correctly.  Treatment Explanation - The following has been discussed with the patient:   RX ordered/plan of care  Anticipated outcomes  Possible risks and side effects  This patient would benefit from PT intervention to resume normal activities.   Rehab potential is fair.              Frequency:  1 X week, once daily  Duration:  for 5 visits  Discharge Plan: Achieve all LTGs, be independent in home treatment program, and reach maximal therapeutic benefit.      Thank you for your referral.    INQUIRIES  Therapist: Veronica Maxwell PT  [address] [address]  Phone: [phone]  Fax: [phone]

## 2021-06-26 NOTE — PROGRESS NOTES
[facility] Initial Evaluation  6/26/2021     Precautions/Restrictions/MD instructions: eval and treat     Therapist Assessment: Kenroy Garrido is a 46 year old male patient presenting to Physical Therapy with R sided cervical pain. Patient demonstrates decreased cervical AROM for extension, R side bend, and R rotation, positive spurling's on R, and decreased symptoms with distraction. Patient has had therapy in past, and found that only thing to relieve symptoms has been mechanical traction and came back to PT for more mechanical traction. Patient had home traction unit at home, but states it did not bring the same relief as the traction unit in clinic. Signs and symptoms are consistent with R cervical radiculopathy. These impairments limit their ability to turn neck, lift arm, and drive without pain. Skilled PT services are necessary in order to reduce impairments and improve independent function.    Subjective History    Injury/Condition Details:  Presenting Complaint Cervical neck pain    Onset Timing/Date 2 years ago, (Doctor's referral 6/15/2021)   Mechanism Insidious onset      Symptom Behavior Details    Primary Symptoms Sporadic symptoms; Activity/position dependent, pain (Location: R side neck C3-C7. numbness , Quality: Sharp and Aching/Throbbing)        Denies locking, catching, giving way, or instability.     Numbness, tingling, changes in sensation into R fingers.     Denies having related symptoms spreading to the R upper arm and R forearm.    Worst Pain 8/10 (with driving)   Symptom Provocators Driving, sitting wrong, sleeping, computer work    Best Pain 2/10    Symptom Relievers Adjusting neck, stretching (patient doing his own stretching, stopped doing PT exercises due to feeling that these exacerbated pain), ibuprofen    Time of day dependent? No   Recent symptom change? no change in symptoms     Prior Testing/Intervention for current condition:  Prior Tests  MRI    Prior Treatment PT, cervical traction      Lifestyle & General Medical History:  Employment Sales - computer or driving    Usual physical activities  (within past year) Play volleyball, biking, golf   Orthopaedic History  None reported by patient    Medication  None reported by patient    Notable medical history None reported by patient    Patient goals Decrease numbness and tingling in R hand    Patient Reported Health good     Red Flags: (Bold when present) - reviewed the following and denies  Vertebrobasilar Artery   Symptom   Dysphagia Drop Attack   Dysarthria Dizziness   Diplopia Paresthesia of lips   Spinal Cord  Symptom   Bi/Quadriparesthesia Ataxia   Hemiparesthesia Urinary incontinence   Bi/Quadriparesis Fecal incontinence     Cranial Nerves - bold when abnormality is present  Cranial nerve   II-Scotoma VIII-Loss of Balance   III-Diplopia VIII-Hypoacousia   V-Facial paresthesia IX-Dysarthria   VII-Altered Taste IX-Dysphagia    X-Nausea       PHYSICAL THERAPY CERVICAL EXAMINATION    STATIC POSTURE  Forward head on neck ,patient naturally sitting with  Increased thoracic kyphosis and Rounded shoulders    Cervical Spine ROM Screen   RIGHT LEFT   Cervical Spine ROM   Flexion Nil loss    Extension Mod loss    Rotation Mod loss Nil loss    Sidebend Mod loss  Nil loss    Seated Thoracic Spine ROM   Rotation Nil loss  Nil loss    Flexion Nil loss  Nil loss    Extension Mod loss Mod loss     Passive Joint Mobility Screen: increased stiffness R C4-C5     Tender to palpation at the following structures: R upper trap, R levator   NOT tender to palpation at the following structures: L upper trap      SUSPECTED DIRECTIONAL PREFERENCE: extension      Upper Extremity Neural System Examination  Myotomes Strength (MMT)    Left Pain Right Pain   Resisted ABD (C5) 5/5 - 5/5 -   Resisted Elbow Flexion (C6)                 Wrist Extension 5/5 -  - 5/5 -  -   Resisted Elbow Extension (C7)                 Wrist Flexion 5/5   -    5/5   -     Resisted Thumb Extension (C8) 5/5 - 5/5 -   Resisted Intrinsics (T1) 5/5 - 5/5 -   Sensory/Reflex Tests: SILT and symmetrical for bilateral UE's.       Right Left   Upper Limb Tension Testing     Medial - -   Ulnar - -   Radial ++ -   - = normal  + = mild tightness  ++ = moderate tightness  +++ = significant tightness        Static Tests:   Alar Ligament test: negative  Transverse Ligament Test:negative  Spurlings: negative  Compression:negative  Distraction:negative     ASSESSMENT/PLAN:  The patient is a 46 year old male with chief complaint of R cervical pain.    The patient has the following significant findings with corresponding treatment plan.  Diagnosis 1:  R cervical radiculopathy     Pain -  mechanical traction, manual therapy, self management, education, directional preference exercise and home program  Decreased ROM/flexibility - manual therapy, therapeutic exercise and home program  Decreased joint mobility - manual therapy, therapeutic exercise and home program  Decreased strength - therapeutic exercise, therapeutic activities and home program  Impaired balance - neuro re-education, therapeutic activities and home program  Decreased proprioception - neuro re-education and therapeutic activities  Impaired gait - gait training and assistive devices  Impaired muscle performance - neuro re-education and home program  Decreased function - therapeutic activities and home program  Impaired posture - neuro re-education, therapeutic activities and home program  Instability -  Therapeutic Activity, Therapeutic Exercise, Neuromuscular Re-education, Splinting/Taping/Bracing/Orthotic, home program      Therapy Evaluation Codes:   1) History comprised of:   Personal factors that impact the plan of care:      Time since onset of symptoms.    Comorbidity factors that impact the plan of care are:      None.     Medications impacting care: None.  2) Examination of Body Systems comprised of:   Body structures  and functions that impact the plan of care:      Cervical spine.   Activity limitations that impact the plan of care are:      Driving, Reading/Computer work and Sitting.  3) Clinical presentation characteristics are:   Stable/Uncomplicated.  4) Decision-Making    Low complexity using standardized patient assessment instrument and/or measureable assessment of functional outcome.  Cumulative Therapy Evaluation is: Low complexity.    Previous and current functional limitations:  (See Goal Flow Sheet for this information)    Short term and Long term goals: (See Goal Flow Sheet for this information)     Communication ability:  Patient appears to be able to clearly communicate and understand verbal and written communication and follow directions correctly.  Treatment Explanation - The following has been discussed with the patient:   RX ordered/plan of care  Anticipated outcomes  Possible risks and side effects  This patient would benefit from PT intervention to resume normal activities.   Rehab potential is fair.    Frequency:  1 X week, once daily  Duration:  for 5 visits  Discharge Plan: Achieve all LTGs, be independent in home treatment program, and reach maximal therapeutic benefit.    Please refer to the daily flowsheet for treatment today, total treatment time and time spent performing 1:1 timed codes.

## 2021-06-28 ENCOUNTER — RECORDS - HEALTHEAST (OUTPATIENT)
Dept: ADMINISTRATIVE | Facility: OTHER | Age: 46
End: 2021-06-28

## 2021-08-06 ENCOUNTER — THERAPY VISIT (OUTPATIENT)
Dept: PHYSICAL THERAPY | Facility: CLINIC | Age: 46
End: 2021-08-06
Payer: COMMERCIAL

## 2021-08-06 DIAGNOSIS — M54.12 CERVICAL RADICULOPATHY: Primary | ICD-10-CM

## 2021-08-06 PROCEDURE — 97012 MECHANICAL TRACTION THERAPY: CPT | Mod: GP | Performed by: PHYSICAL THERAPIST

## 2021-08-06 PROCEDURE — 97110 THERAPEUTIC EXERCISES: CPT | Mod: GP | Performed by: PHYSICAL THERAPIST

## 2021-09-10 ENCOUNTER — THERAPY VISIT (OUTPATIENT)
Dept: PHYSICAL THERAPY | Facility: CLINIC | Age: 46
End: 2021-09-10
Payer: COMMERCIAL

## 2021-09-10 DIAGNOSIS — M54.12 CERVICAL RADICULOPATHY: ICD-10-CM

## 2021-09-10 PROCEDURE — 97012 MECHANICAL TRACTION THERAPY: CPT | Mod: GP | Performed by: PHYSICAL THERAPIST

## 2021-09-10 PROCEDURE — 97110 THERAPEUTIC EXERCISES: CPT | Mod: GP | Performed by: PHYSICAL THERAPIST

## 2021-10-02 ENCOUNTER — HEALTH MAINTENANCE LETTER (OUTPATIENT)
Age: 46
End: 2021-10-02

## 2021-10-08 ENCOUNTER — THERAPY VISIT (OUTPATIENT)
Dept: PHYSICAL THERAPY | Facility: CLINIC | Age: 46
End: 2021-10-08
Payer: COMMERCIAL

## 2021-10-08 DIAGNOSIS — M54.12 CERVICAL RADICULOPATHY: ICD-10-CM

## 2021-10-08 PROCEDURE — 97012 MECHANICAL TRACTION THERAPY: CPT | Mod: GP | Performed by: PHYSICAL THERAPIST

## 2021-11-12 ENCOUNTER — THERAPY VISIT (OUTPATIENT)
Dept: PHYSICAL THERAPY | Facility: CLINIC | Age: 46
End: 2021-11-12
Payer: COMMERCIAL

## 2021-11-12 DIAGNOSIS — M54.12 CERVICAL RADICULOPATHY: ICD-10-CM

## 2021-11-12 PROCEDURE — 97012 MECHANICAL TRACTION THERAPY: CPT | Mod: GP | Performed by: PHYSICAL THERAPIST

## 2022-05-14 ENCOUNTER — HEALTH MAINTENANCE LETTER (OUTPATIENT)
Age: 47
End: 2022-05-14

## 2022-06-16 ENCOUNTER — OFFICE VISIT (OUTPATIENT)
Dept: FAMILY MEDICINE | Facility: CLINIC | Age: 47
End: 2022-06-16
Payer: COMMERCIAL

## 2022-06-16 VITALS
TEMPERATURE: 97.1 F | DIASTOLIC BLOOD PRESSURE: 75 MMHG | BODY MASS INDEX: 23.83 KG/M2 | WEIGHT: 170.2 LBS | HEART RATE: 79 BPM | RESPIRATION RATE: 14 BRPM | SYSTOLIC BLOOD PRESSURE: 106 MMHG | OXYGEN SATURATION: 97 % | HEIGHT: 71 IN

## 2022-06-16 DIAGNOSIS — R21 GROIN RASH: Primary | ICD-10-CM

## 2022-06-16 PROCEDURE — 99213 OFFICE O/P EST LOW 20 MIN: CPT | Performed by: PHYSICIAN ASSISTANT

## 2022-06-16 RX ORDER — CALCIUM CARB/VITAMIN D3/VIT K1 500-100-40
TABLET,CHEWABLE ORAL 2 TIMES DAILY PRN
Qty: 120 G | Refills: 0 | Status: SHIPPED | OUTPATIENT
Start: 2022-06-16

## 2022-06-16 RX ORDER — TRIAMCINOLONE ACETONIDE 1 MG/G
CREAM TOPICAL 2 TIMES DAILY PRN
Qty: 15 G | Refills: 1 | Status: SHIPPED | OUTPATIENT
Start: 2022-06-16

## 2022-06-16 RX ORDER — MUPIROCIN 20 MG/G
OINTMENT TOPICAL 2 TIMES DAILY PRN
Qty: 15 G | Refills: 1 | Status: SHIPPED | OUTPATIENT
Start: 2022-06-16

## 2022-06-16 ASSESSMENT — PAIN SCALES - GENERAL: PAINLEVEL: NO PAIN (0)

## 2022-06-16 NOTE — PATIENT INSTRUCTIONS
Arjun Jasmine,    Thank you for allowing [facility] to manage your care.    This could be jock itch, so try the spray powder and itch cream for 1-2 weeks. If this is not working, stop the powder and instead use the ointment.    I sent your prescriptions to your pharmacy.    If you have any questions or concerns, please feel free to call us at [phone]    Sincerely,    Yonatan Hines PA-C    Did you know?      You can schedule a video visit for follow-up appointments as well as future appointments for certain conditions.  Please see the below link.     [email or web address]    If you have not already done so,  I encourage you to sign up for [employer] ([email or web address]).  This will allow you to review your results, securely communicate with a provider, and schedule virtual visits as well.

## 2022-06-16 NOTE — PROGRESS NOTES
Assessment & Plan   Problem List Items Addressed This Visit    None     Visit Diagnoses     Groin rash    -  Primary    Relevant Medications    tolnaftate (LAMISIL) 1 % external spray    triamcinolone (KENALOG) 0.1 % external cream    mupirocin (BACTROBAN) 2 % external ointment         Groin rash of unknown etiology. Locations suggestive of tinea cruris, but has thus far not responded to clotrimazole. Component of folliculitis to buttocks. Low suspicion for worrisome bacterial infection such as Andrea's gangrene, abscess, cellulitis, or necrotizing fasciitis. Appears well and non-toxic and I have low suspicion for systemic illness. Will switch to antifungal powder and steroid cream. If this is not improving in the next 1-2 weeks, he will use the Bactroban. Follow up with us or primary as needed.    Complete history and physical exam as below. AF with normal VS.    DDx and Dx discussed with and explained to the pt to their satisfaction.  All questions were answered at this time. Pt expressed understanding of and agreement with this dx, tx, and plan. No further workup warranted and standard medication warnings given. I have given the patient a list of pertinent indications for re-evaluation. Will go to the Emergency Department if symptoms worsen or new concerning symptoms arise. Patient left in no apparent distress.      See Patient Instructions    Return for a recheck of your symptoms if not improving, or call 911/go to an ER anytime if worsening.    REJI Tamez  [facility] MANAV Jasmine is a 47 year old presenting for the following health issues:  Rash      HPI     Rash  Onset/Duration: 2 months  Description  Location: inner left thigh, left buttock, bottom of scrotum  Character: doesn't know  Itching: mild  Intensity:  moderate  Progression of Symptoms:  worsening  Accompanying signs and symptoms:   Fever: no  Body aches or joint pain: no  Sore throat symptoms: no  Recent  "cold symptoms: no  History:           Previous episodes of similar rash: None  New exposures:  None  Recent travel: no  Exposure to similar rash: no  Precipitating or alleviating factors: scratching makes it worse  Therapies tried and outcome: Lotrimin cream    No changes in BM/urination.     Review of Systems   Constitutional, HEENT, cardiovascular, pulmonary, gi and gu systems are negative, except as otherwise noted.      Objective    /75   Pulse 79   Temp 97.1  F (36.2  C) (Tympanic)   Resp 14   Ht 1.795 m (5' 10.67\")   Wt 77.2 kg (170 lb 3.2 oz)   SpO2 97%   BMI 23.96 kg/m    Body mass index is 23.96 kg/m .  Physical Exam  Vitals and nursing note reviewed.   Constitutional:       General: He is not in acute distress.     Appearance: Normal appearance. He is not diaphoretic.   HENT:      Head: Normocephalic and atraumatic.      Nose: Nose normal.   Eyes:      Conjunctiva/sclera: Conjunctivae normal.   Pulmonary:      Effort: Pulmonary effort is normal. No respiratory distress.   Genitourinary:     Comments: Circumcised. Mildly erythematous patch to perineum and buttocks adjacent to the gluteal cleft.  No warmth, crepitus, drainage, or other overlying signs of trauma or infection. Scattered follicular pustules to buttocks. Scrotal contents non-tender. No blood or discharge at the meatus. No inguinal adenopathy.    Skin:     General: Skin is dry.      Coloration: Skin is not jaundiced or pale.   Neurological:      General: No focal deficit present.      Mental Status: He is alert. Mental status is at baseline.   Psychiatric:         Mood and Affect: Mood normal.         Behavior: Behavior normal.                      .  ..  "

## 2022-08-29 DIAGNOSIS — R21 GROIN RASH: Primary | ICD-10-CM

## 2022-08-29 RX ORDER — FLUCONAZOLE 150 MG/1
150 TABLET ORAL
Qty: 4 TABLET | Refills: 0 | Status: SHIPPED | OUTPATIENT
Start: 2022-08-29 | End: 2022-09-20

## 2022-08-29 NOTE — PROGRESS NOTES
Will switch to fluconazole qweek x 4 weeks for tinea cruris. Follow up with derm if not improving.

## 2022-09-03 ENCOUNTER — HEALTH MAINTENANCE LETTER (OUTPATIENT)
Age: 47
End: 2022-09-03

## 2023-01-18 ENCOUNTER — LAB (OUTPATIENT)
Dept: LAB | Facility: CLINIC | Age: 48
End: 2023-01-18
Attending: PHYSICIAN ASSISTANT
Payer: COMMERCIAL

## 2023-01-18 ENCOUNTER — E-VISIT (OUTPATIENT)
Dept: URGENT CARE | Facility: CLINIC | Age: 48
End: 2023-01-18
Payer: COMMERCIAL

## 2023-01-18 DIAGNOSIS — Z20.822 SUSPECTED COVID-19 VIRUS INFECTION: ICD-10-CM

## 2023-01-18 DIAGNOSIS — J02.9 SORE THROAT: ICD-10-CM

## 2023-01-18 LAB
DEPRECATED S PYO AG THROAT QL EIA: NEGATIVE
GROUP A STREP BY PCR: NOT DETECTED

## 2023-01-18 PROCEDURE — 99421 OL DIG E/M SVC 5-10 MIN: CPT | Mod: CS | Performed by: PHYSICIAN ASSISTANT

## 2023-01-18 PROCEDURE — U0003 INFECTIOUS AGENT DETECTION BY NUCLEIC ACID (DNA OR RNA); SEVERE ACUTE RESPIRATORY SYNDROME CORONAVIRUS 2 (SARS-COV-2) (CORONAVIRUS DISEASE [COVID-19]), AMPLIFIED PROBE TECHNIQUE, MAKING USE OF HIGH THROUGHPUT TECHNOLOGIES AS DESCRIBED BY CMS-2020-01-R: HCPCS

## 2023-01-18 PROCEDURE — U0005 INFEC AGEN DETEC AMPLI PROBE: HCPCS

## 2023-01-18 PROCEDURE — 87651 STREP A DNA AMP PROBE: CPT

## 2023-01-18 NOTE — PATIENT INSTRUCTIONS
Dear Kenroy,    Your symptoms show that you may have COVID-19.     Because you also reported sore throat, I would like to also test you for strep throat to determine if we need to treat you for that as well.    What should I do?  We would like to test you for COVID-19 virus and Strep Throat. I have placed orders for these tests.   To schedule: go to your [employer] home page and scroll down to the section that says  You have an appointment that needs to be scheduled  and click the large green button that says  Schedule Now  and follow the steps to find the next available openings. It is important that when you are asked what the reason for your appointment is that you mention you need BOTH COVID and Strep tests.    If you are unable to complete these [employer] scheduling steps, please call [phone] to schedule your testing.     How do I self-isolate?  You isolate when you have symptoms of COVID or a test shows you have COVID, even if you don t have symptoms.     If you DO have symptoms:  o Stay home and away from others  - For at least 5 days after your symptoms started, AND   - You are fever free for 24 hours (with no medicine that reduces fever), AND  - Your other symptoms are better.  o Wear a mask for 10 full days any time you are around others.    If you DON T have symptoms:  o Stay at home and away from others for at least 5 days after your positive test.  o Wear a mask for 10 full days any time you are around others.    How can I take care of myself?  Over the counter medications may help with your symptoms such as runny or stuffy nose, cough, chills, or fever. Talk to your care team about your options.   Some people are at high risk of severe illness (for example, you have a weak immune system, you re 65 years or older, or you have certain medical problems). If your risk is high and your symptoms started in the last 5 days, we strongly recommend for you to get COVID treatment as soon as possible. Paxlovid and  Molnupiravir are proven safe and effective, make you feel better faster, and prevent hospitalization and death.       To schedule an appointment to discuss COVID treatment, request an appointment on [facility] (select  COVID-19 Treatment ) or call [address] [place], [place] [address]ALEJANDRO ([phone]).    Get lots of rest. Drink extra fluids (unless a doctor has told you not to)    Take Tylenol (acetaminophen) or ibuprofen for fever or pain. If you have liver or kidney problems, ask your family doctor if it's okay to take Tylenol or ibuprofen    Take over the counter medications for your symptoms, as directed by your doctor. You may also talk to your pharmacist.      If you have other health problems (like cancer, heart failure, an organ transplant or severe kidney disease): Call your specialty clinic if you don't feel better in the next 2 days.    Know when to call 911. Emergency warning signs include:  o Trouble breathing or shortness of breath  o Pain or pressure in the chest that doesn't go away  o Feeling confused like you haven't felt before, or not being able to wake up  o Bluish-colored lips or face    Where can I get more information?    [facility] - About COVID-19: [email or web address]     CDC - What to Do If You're Sick: https://www.cdc.gov/coronavirus/2019-ncov/if-you-are-sick/index.html    CDC -  Isolation https://www.cdc.gov/coronavirus/2019-ncov/your-health/isolation.html

## 2023-01-19 LAB — SARS-COV-2 RNA RESP QL NAA+PROBE: NEGATIVE

## 2023-01-20 ENCOUNTER — MYC MEDICAL ADVICE (OUTPATIENT)
Dept: FAMILY MEDICINE | Facility: CLINIC | Age: 48
End: 2023-01-20
Payer: COMMERCIAL

## 2023-01-20 NOTE — TELEPHONE ENCOUNTER
See provider response to patient's mychart concerns in this encounter:      Dianne Fofana MDPhysician  8:33 AM    Edit                 Noted.   Persistent symptoms following a recent Virtual visit.   Recommend a follow up visit. Preferably, in-person. Thanks.                                  Routed message to patient advising he schedule an in person visit with any available provider.    Yani Monroe RN  [facility]

## 2023-01-20 NOTE — CONFIDENTIAL NOTE
Noted.   Persistent symptoms following a recent Virtual visit.   Recommend a follow up visit. Preferably, in-person. Thanks.

## 2023-06-02 ENCOUNTER — OFFICE VISIT (OUTPATIENT)
Dept: DERMATOLOGY | Facility: CLINIC | Age: 48
End: 2023-06-02
Attending: PHYSICIAN ASSISTANT
Payer: COMMERCIAL

## 2023-06-02 ENCOUNTER — HEALTH MAINTENANCE LETTER (OUTPATIENT)
Age: 48
End: 2023-06-02

## 2023-06-02 DIAGNOSIS — L29.9 PRURITUS: Primary | ICD-10-CM

## 2023-06-02 DIAGNOSIS — L30.9 DERMATITIS: ICD-10-CM

## 2023-06-02 PROCEDURE — 99204 OFFICE O/P NEW MOD 45 MIN: CPT | Performed by: NURSE PRACTITIONER

## 2023-06-02 RX ORDER — HYDROCORTISONE 2.5 %
CREAM (GRAM) TOPICAL 2 TIMES DAILY
Qty: 30 G | Refills: 1 | Status: SHIPPED | OUTPATIENT
Start: 2023-06-02

## 2023-06-02 RX ORDER — KETOCONAZOLE 20 MG/G
CREAM TOPICAL
Qty: 60 G | Refills: 11 | Status: SHIPPED | OUTPATIENT
Start: 2023-06-02 | End: 2024-09-20

## 2023-06-02 NOTE — LETTER
6/2/2023         RE: Kenroy Garrido  [address] [place] [address]        Dear Colleague,    Thank you for referring your patient, Kenroy Garrido, to the [facility]. Please see a copy of my visit note below.    [facility] Dermatology Note  Encounter Date: Jun 2, 2023  Office Visit     Reviewed patients past medical history and pertinent chart review prior to patients visit today.     Dermatology Problem List:  Patient denies personal history of skin cancer or dysplastic nevi.   Patient denies family history of skin cancer or dysplastic nevi.   ____________________________________________    Assessment & Plan:     #Pruritic dermatitis to the scotum  - Start ketoconazole 2% cream 1-2x daily until rash is cleared with 2 additional weeks and hydrocortisone 2.5% cream twice daily until rash has cleared.   - Start Benadryl 25 mg at night to reduce itching until nocturnal itching is controlled then stop  - Cover hands with cotton gloves overnight during sleep to avoid scratching at night   -discussed itch/scratch cycle, try to reduce scratching, rubbing or stimulating nerves.     Follow-up in 1 months if not fully resolved.     Written by Christina Holcomb working as a scribe for GENI Rodney CNP on 06/02/23    Tomasa Galindo CNP  Dermatology   _______________________________________    CC: Derm Problem (Rash at base scrotum and between buttock crease along with possible skin tag at base of scrotum, present a years and noo change with seasons, different weather/OTC anti fungal, and Triam 0.1%, Bactroban 2%/Oral antibiotic no efffcetive./Itchy and burns 24/7//Calamine was the most effective OTC in the past but quit working )    HPI:  Mr. Kenroy Garrido is a(n) 48 year old male who presents today as a new patient for a rash at the base of the scrotum and between the buttock crease. These have been present for the last 1 year and has not noticed any changes with the season  or weather  The rash can be itchy and has a burning sensation.  He has found that calamine was the most effective in the past, but this has recently stopped working.  He tried antifungal creams and sprays for about 2 weeks which did not resolve rash.  He was given oral abx which did not help.     Patient is otherwise feeling well, without additional skin concerns.    Physical Exam:  SKIN: Focused examination of genital region was performed.  - Erythema on the sides of the scrotum.  Inguinal creases and genitals are clear of any rashes.   - discrete Erythema in the perianal and gluteal crease    - No other lesions of concern on areas examined.     Medications:  Current Outpatient Medications   Medication     atorvastatin (LIPITOR) 20 MG tablet     mupirocin (BACTROBAN) 2 % external ointment     predniSONE (DELTASONE) 20 MG tablet     Pseudoeph-Doxylamine-DM-APAP (NYQUIL MULTI-SYMPTOM OR)     sildenafil (REVATIO) 20 MG tablet     tolnaftate (LAMISIL) 1 % external spray     triamcinolone (KENALOG) 0.1 % external cream     No current facility-administered medications for this visit.      Past Medical History:   Patient Active Problem List   Diagnosis     CARDIOVASCULAR SCREENING; LDL GOAL LESS THAN 160     SHELLI (generalized anxiety disorder)     Erectile dysfunction, unspecified erectile dysfunction type     Cervical radiculopathy     Past Medical History:   Diagnosis Date     NO ACTIVE PROBLEMS        CC REJI Tamez  [address] [place] [address] on close of this encounter.         Again, thank you for allowing me to participate in the care of your patient.        Sincerely,        Tracy Galindo, GENI CNP

## 2023-06-02 NOTE — PROGRESS NOTES
[facility] Dermatology Note  Encounter Date: Jun 2, 2023  Office Visit     Reviewed patients past medical history and pertinent chart review prior to patients visit today.     Dermatology Problem List:  Patient denies personal history of skin cancer or dysplastic nevi.   Patient denies family history of skin cancer or dysplastic nevi.   ____________________________________________    Assessment & Plan:     #Pruritic dermatitis to the scotum  - Start ketoconazole 2% cream 1-2x daily until rash is cleared with 2 additional weeks and hydrocortisone 2.5% cream twice daily until rash has cleared.   - Start Benadryl 25 mg at night to reduce itching until nocturnal itching is controlled then stop  - Cover hands with cotton gloves overnight during sleep to avoid scratching at night   -discussed itch/scratch cycle, try to reduce scratching, rubbing or stimulating nerves.     Follow-up in 1 months if not fully resolved.     Written by Christina Holcomb working as a scribe for GENI Rodney CNP on 06/02/23    Tomasa Galindo CNP  Dermatology   _______________________________________    CC: Derm Problem (Rash at base scrotum and between buttock crease along with possible skin tag at base of scrotum, present a years and noo change with seasons, different weather/OTC anti fungal, and Triam 0.1%, Bactroban 2%/Oral antibiotic no efffcetive./Itchy and burns 24/7//Calamine was the most effective OTC in the past but quit working )    HPI:  Mr. Kenroy Garrido is a(n) 48 year old male who presents today as a new patient for a rash at the base of the scrotum and between the buttock crease. These have been present for the last 1 year and has not noticed any changes with the season or weather  The rash can be itchy and has a burning sensation.  He has found that calamine was the most effective in the past, but this has recently stopped working.  He tried antifungal creams and sprays for about 2 weeks which did not resolve  rash.  He was given oral abx which did not help.     Patient is otherwise feeling well, without additional skin concerns.    Physical Exam:  SKIN: Focused examination of genital region was performed.  - Erythema on the sides of the scrotum.  Inguinal creases and genitals are clear of any rashes.   - discrete Erythema in the perianal and gluteal crease    - No other lesions of concern on areas examined.     Medications:  Current Outpatient Medications   Medication     atorvastatin (LIPITOR) 20 MG tablet     mupirocin (BACTROBAN) 2 % external ointment     predniSONE (DELTASONE) 20 MG tablet     Pseudoeph-Doxylamine-DM-APAP (NYQUIL MULTI-SYMPTOM OR)     sildenafil (REVATIO) 20 MG tablet     tolnaftate (LAMISIL) 1 % external spray     triamcinolone (KENALOG) 0.1 % external cream     No current facility-administered medications for this visit.      Past Medical History:   Patient Active Problem List   Diagnosis     CARDIOVASCULAR SCREENING; LDL GOAL LESS THAN 160     SHELLI (generalized anxiety disorder)     Erectile dysfunction, unspecified erectile dysfunction type     Cervical radiculopathy     Past Medical History:   Diagnosis Date     NO ACTIVE PROBLEMS        CC REJI Tamez  [address] [place] [id number] on close of this encounter.

## 2023-06-02 NOTE — PATIENT INSTRUCTIONS
In the morning and at night he has both the ketoconazole 2% cream and the hydrocortisone 2.5% cream.  Continue this until the rash is fully resolved then stop the hydrocortisone but continue to use the ketoconazole cream for 2 extra weeks past when the rash seems fully gone.  If at that time nothing is flaring you can stop.    You may want to start a Benadryl if you are finding that your itching overnight, take this about an hour before bedtime and maybe it will help your itching and make you a little more tired so that you are not scratching at night.  You can stop this one of the overnight itching stops.  Patient Education       Proper skin care from [facility]:    -Eliminate harsh soaps as they strip the natural oils from the skin, often resulting in dry itchy skin ( i.e. Dial, Zest, Taiwanese Spring)  -Use mild soaps such as Cetaphil or Dove Sensitive Skin in the shower. You do not need to use soap on arms, legs, and trunk every time you shower unless visibly soiled.   -Avoid hot or cold showers.  -After showering, lightly dry off and apply moisturizing within 2-3 minutes. This will help trap moisture in the skin.   -Aggressive use of a moisturizer at least 1-2 times a day to the entire body (including -Vanicream, Cetaphil, Aquaphor or Cerave) and moisturize hands after every washing.  -We recommend using moisturizers that come in a tub that needs to be scooped out, not a pump. This has more of an oil base. It will hold moisture in your skin much better than a water base moisturizer. The above recommended are non-pore clogging.      Wear a sunscreen with at least SPF 30 on your face, ears, neck and V of the chest daily. Wear sunscreen on other areas of the body if those areas are exposed to the sun throughout the day. Sunscreens can contain physical and/or chemical blockers. Physical blockers are less likely to clog pores, these include zinc oxide and titanium dioxide. Reapply every two hour and after  swimming.     Sunscreen examples: https://www.ewg.org/sunscreen/    UV radiation  UVA radiation remains constant throughout the day and throughout the year. It is a longer wavelength than UVB and therefore penetrates deeper into the skin leading to immediate and delayed tanning, photoaging, and skin cancer. 70-80% of UVA and UVB radiation occurs between the hours of 10am-2pm.  UVB radiation  UVB radiation causes the most harmful effects and is more significant during the summer months. However, snow and ice can reflect UVB radiation leading to skin damage during the winter months as well. UVB radiation is responsible for tanning, burning, inflammation, delayed erythema (pinkness), pigmentation (brown spots), and skin cancer.     I recommend self monthly full body exams and yearly full body exams with a dermatology provider. If you develop a new or changing lesion please follow up for examination. Most skin cancers are pink and scaly or pink and pearly. However, we do see blue/brown/black skin cancers.  Consider the ABCDEs of melanoma when giving yourself your monthly full body exam ( don't forget the groin, buttocks, feet, toes, etc). A-asymmetry, B-borders, C-color, D-diameter, E-elevation or evolving. If you see any of these changes please follow up in clinic. If you cannot see your back I recommend purchasing a hand held mirror to use with a larger wall mirror.       Checking for Skin Cancer  You can find cancer early by checking your skin each month. There are 3 kinds of skin cancer. They are melanoma, basal cell carcinoma, and squamous cell carcinoma. Doing monthly skin checks is the best way to find new marks or skin changes. Follow the instructions below for checking your skin.   The ABCDEs of checking moles for melanoma   Check your moles or growths for signs of melanoma using ABCDE:   Asymmetry: the sides of the mole or growth don t match  Border: the edges are ragged, notched, or blurred  Color: the color  within the mole or growth varies  Diameter: the mole or growth is larger than 6 mm (size of a pencil eraser)  Evolving: the size, shape, or color of the mole or growth is changing (evolving is not shown in the images below)    Checking for other types of skin cancer  Basal cell carcinoma or squamous cell carcinoma have symptoms such as:     A spot or mole that looks different from all other marks on your skin  Changes in how an area feels, such as itching, tenderness, or pain  Changes in the skin's surface, such as oozing, bleeding, or scaliness  A sore that does not heal  New swelling or redness beyond the border of a mole    Who s at risk?  Anyone can get skin cancer. But you are at greater risk if you have:   Fair skin, light-colored hair, or light-colored eyes  Many moles or abnormal moles on your skin  A history of sunburns from sunlight or tanning beds  A family history of skin cancer  A history of exposure to radiation or chemicals  A weakened immune system  If you have had skin cancer in the past, you are at risk for recurring skin cancer.   How to check your skin  Do your monthly skin checkups in front of a full-length mirror. Check all parts of your body, including your:   Head (ears, face, neck, and scalp)  Torso (front, back, and sides)  Arms (tops, undersides, upper, and lower armpits)  Hands (palms, backs, and fingers, including under the nails)  Buttocks and genitals  Legs (front, back, and sides)  Feet (tops, soles, toes, including under the nails, and between toes)  If you have a lot of moles, take digital photos of them each month. Make sure to take photos both up close and from a distance. These can help you see if any moles change over time.   Most skin changes are not cancer. But if you see any changes in your skin, call your doctor right away. Only he or she can diagnose a problem. If you have skin cancer, seeing your doctor can be the first step toward getting the treatment that could save  your life.   [employer] last reviewed this educational content on 4/1/2019 2000-2020 [employer]. [address] [place] [address], [place] [address]. All rights reserved. This information is not intended as a substitute for professional medical care. Always follow your healthcare professional's instructions.       When should I call my doctor?  If you are worsening or not improving, please, contact us or seek urgent care as noted below.     Who should I call with questions (adults)?  [facility] (adult and pediatric): [phone]  [facility] (adult): [phone]  [facility] ([facility] and [place]) [phone]  For urgent needs outside of business hours call the [facility] at [phone] and ask for the dermatology resident on call to be paged  If this is a medical emergency and you are unable to reach an ER, Call [phone]      If you need a prescription refill, please contact your pharmacy. Refills are approved or denied by our Physicians during normal business hours, Monday through Fridays  Per office policy, refills will not be granted if you have not been seen within the past year (or sooner depending on your child's condition)

## 2023-06-29 ENCOUNTER — OFFICE VISIT (OUTPATIENT)
Dept: DERMATOLOGY | Facility: CLINIC | Age: 48
End: 2023-06-29
Payer: COMMERCIAL

## 2023-06-29 DIAGNOSIS — L29.9 PRURITUS: Primary | ICD-10-CM

## 2023-06-29 DIAGNOSIS — L30.9 DERMATITIS: ICD-10-CM

## 2023-06-29 PROCEDURE — 99213 OFFICE O/P EST LOW 20 MIN: CPT | Performed by: NURSE PRACTITIONER

## 2023-06-29 NOTE — LETTER
6/29/2023         RE: Kenroy Garrido  [address] [facility] [place] [address]        Dear Colleague,    Thank you for referring your patient, Kenroy Garrido, to the [facility]. Please see a copy of my visit note below.    [facility] Dermatology Note  Encounter Date: Jun 29, 2023  Office Visit     Reviewed patients past medical history and pertinent chart review prior to patients visit today.     Dermatology Problem List:  Pruritic dermatitis of scrotum, intertrigo possibly. Ketoconazole and hydrocortisone 2.5 given     Patient denies personal history of skin cancer or dysplastic nevi.   Patient denies family history of skin cancer or dysplastic nevi.   ____________________________________________    Assessment & Plan:     #Pruritic dermatitis to the scrotum, appears resolved but itching still present  - continue ketoconazole 2% cream 1 time daily until rash is cleared with 2 additional weeks and hydrocortisone 2.5% cream twice daily until itching has cleared then stop, up to 1 month consistent use okay. Councled about use and side effects of thinning of the skin, striae, erythema, and worsening of rash.     -discussed itch/scratch cycle, try to reduce scratching, rubbing or stimulating nerves.     Follow-up in 1 months if not fully resolved.     Tomasa Galindo, CNP  Dermatology   _______________________________________    CC: Rash (More rash appearance, itching to buttock resolved but the scrotum is still very itchy. /Using Keto cream PRN and HC cream 2 x a day. Did not start the Benadryl at night and not needed to wear gloves, not waking due to itching)    HPI:  Mr. Kenroy Garrido is a(n) 48 year old male who presents today as a follow-up for itching in the groin.  He says the itching around the buttock has completely resolved.  The rash on the scrotum he thinks is still present.  He has been using the ketoconazole cream twice daily and has stopped the hydrocortisone cream.  He  says that the pharmacist told him to absolutely not use the hydrocortisone cream for more than 14 days so he thinks he did it 14 days or less.  The itching was significantly improved when he was using hydrocortisone and has come back some since he stopped it.    History of rash: Rash on the buttock and scrotum had been present for the last 1 year and has not noticed any changes with the season or weather  The rash can be itchy and has a burning sensation.  He has found that calamine was the most effective in the past, but this has recently stopped working.  He tried antifungal creams and sprays for about 2 weeks which did not resolve rash.  He was given oral abx which did not help.     Patient is otherwise feeling well, without additional skin concerns.    Physical Exam:  SKIN: Focused examination of genital region was performed.  -Skin of the genitals and buttock area appears normal without erythema papules pustules or flaking dry skin    - No other lesions of concern on areas examined.     Medications:  Current Outpatient Medications   Medication     hydrocortisone 2.5 % cream     ketoconazole (NIZORAL) 2 % external cream     atorvastatin (LIPITOR) 20 MG tablet     mupirocin (BACTROBAN) 2 % external ointment     predniSONE (DELTASONE) 20 MG tablet     Pseudoeph-Doxylamine-DM-APAP (NYQUIL MULTI-SYMPTOM OR)     sildenafil (REVATIO) 20 MG tablet     tolnaftate (LAMISIL) 1 % external spray     triamcinolone (KENALOG) 0.1 % external cream     No current facility-administered medications for this visit.      Past Medical History:   Patient Active Problem List   Diagnosis     CARDIOVASCULAR SCREENING; LDL GOAL LESS THAN 160     SHELLI (generalized anxiety disorder)     Erectile dysfunction, unspecified erectile dysfunction type     Cervical radiculopathy     Past Medical History:   Diagnosis Date     NO ACTIVE PROBLEMS        CC REJI Tamez  [address] [place] [address] on close of this encounter.        Again, thank you for allowing me to participate in the care of your patient.        Sincerely,        GENI Julien CNP

## 2023-06-29 NOTE — PROGRESS NOTES
[facility] Dermatology Note  Encounter Date: Jun 29, 2023  Office Visit     Reviewed patients past medical history and pertinent chart review prior to patients visit today.     Dermatology Problem List:  Pruritic dermatitis of scrotum, intertrigo possibly. Ketoconazole and hydrocortisone 2.5 given     Patient denies personal history of skin cancer or dysplastic nevi.   Patient denies family history of skin cancer or dysplastic nevi.   ____________________________________________    Assessment & Plan:     #Pruritic dermatitis to the scrotum, appears resolved but itching still present  - continue ketoconazole 2% cream 1 time daily until rash is cleared with 2 additional weeks and hydrocortisone 2.5% cream twice daily until itching has cleared then stop, up to 1 month consistent use okay. Councled about use and side effects of thinning of the skin, striae, erythema, and worsening of rash.     -discussed itch/scratch cycle, try to reduce scratching, rubbing or stimulating nerves.     Follow-up in 1 months if not fully resolved.     Tomasa Galindo, CNP  Dermatology   _______________________________________    CC: Rash (More rash appearance, itching to buttock resolved but the scrotum is still very itchy. /Using Keto cream PRN and HC cream 2 x a day. Did not start the Benadryl at night and not needed to wear gloves, not waking due to itching)    HPI:  Mr. eKnroy Garrido is a(n) 48 year old male who presents today as a follow-up for itching in the groin.  He says the itching around the buttock has completely resolved.  The rash on the scrotum he thinks is still present.  He has been using the ketoconazole cream twice daily and has stopped the hydrocortisone cream.  He says that the pharmacist told him to absolutely not use the hydrocortisone cream for more than 14 days so he thinks he did it 14 days or less.  The itching was significantly improved when he was using hydrocortisone and has come back some since he  stopped it.    History of rash: Rash on the buttock and scrotum had been present for the last 1 year and has not noticed any changes with the season or weather  The rash can be itchy and has a burning sensation.  He has found that calamine was the most effective in the past, but this has recently stopped working.  He tried antifungal creams and sprays for about 2 weeks which did not resolve rash.  He was given oral abx which did not help.     Patient is otherwise feeling well, without additional skin concerns.    Physical Exam:  SKIN: Focused examination of genital region was performed.  -Skin of the genitals and buttock area appears normal without erythema papules pustules or flaking dry skin    - No other lesions of concern on areas examined.     Medications:  Current Outpatient Medications   Medication     hydrocortisone 2.5 % cream     ketoconazole (NIZORAL) 2 % external cream     atorvastatin (LIPITOR) 20 MG tablet     mupirocin (BACTROBAN) 2 % external ointment     predniSONE (DELTASONE) 20 MG tablet     Pseudoeph-Doxylamine-DM-APAP (NYQUIL MULTI-SYMPTOM OR)     sildenafil (REVATIO) 20 MG tablet     tolnaftate (LAMISIL) 1 % external spray     triamcinolone (KENALOG) 0.1 % external cream     No current facility-administered medications for this visit.      Past Medical History:   Patient Active Problem List   Diagnosis     CARDIOVASCULAR SCREENING; LDL GOAL LESS THAN 160     SHELLI (generalized anxiety disorder)     Erectile dysfunction, unspecified erectile dysfunction type     Cervical radiculopathy     Past Medical History:   Diagnosis Date     NO ACTIVE PROBLEMS        REJI Jones  [address] [place] [address] on close of this encounter.

## 2024-03-01 ENCOUNTER — OFFICE VISIT (OUTPATIENT)
Dept: FAMILY MEDICINE | Facility: CLINIC | Age: 49
End: 2024-03-01
Payer: COMMERCIAL

## 2024-03-01 VITALS
WEIGHT: 179 LBS | RESPIRATION RATE: 16 BRPM | HEIGHT: 71 IN | BODY MASS INDEX: 25.06 KG/M2 | HEART RATE: 70 BPM | TEMPERATURE: 98.1 F | SYSTOLIC BLOOD PRESSURE: 128 MMHG | OXYGEN SATURATION: 99 % | DIASTOLIC BLOOD PRESSURE: 78 MMHG

## 2024-03-01 DIAGNOSIS — Z71.89 ADVANCE CARE PLANNING: ICD-10-CM

## 2024-03-01 DIAGNOSIS — Z13.220 SCREENING FOR HYPERLIPIDEMIA: ICD-10-CM

## 2024-03-01 DIAGNOSIS — N52.8 OTHER MALE ERECTILE DYSFUNCTION: ICD-10-CM

## 2024-03-01 DIAGNOSIS — Z00.00 ROUTINE GENERAL MEDICAL EXAMINATION AT A HEALTH CARE FACILITY: Primary | ICD-10-CM

## 2024-03-01 DIAGNOSIS — Z23 ENCOUNTER FOR ADMINISTRATION OF VACCINE: ICD-10-CM

## 2024-03-01 DIAGNOSIS — Z12.5 SCREENING FOR PROSTATE CANCER: ICD-10-CM

## 2024-03-01 DIAGNOSIS — Z12.11 SCREEN FOR COLON CANCER: ICD-10-CM

## 2024-03-01 PROCEDURE — 90715 TDAP VACCINE 7 YRS/> IM: CPT | Performed by: STUDENT IN AN ORGANIZED HEALTH CARE EDUCATION/TRAINING PROGRAM

## 2024-03-01 PROCEDURE — 90471 IMMUNIZATION ADMIN: CPT | Performed by: STUDENT IN AN ORGANIZED HEALTH CARE EDUCATION/TRAINING PROGRAM

## 2024-03-01 PROCEDURE — 99396 PREV VISIT EST AGE 40-64: CPT | Mod: 25 | Performed by: STUDENT IN AN ORGANIZED HEALTH CARE EDUCATION/TRAINING PROGRAM

## 2024-03-01 PROCEDURE — 99213 OFFICE O/P EST LOW 20 MIN: CPT | Mod: 25 | Performed by: STUDENT IN AN ORGANIZED HEALTH CARE EDUCATION/TRAINING PROGRAM

## 2024-03-01 RX ORDER — SILDENAFIL CITRATE 20 MG/1
20 TABLET ORAL PRN
Qty: 30 TABLET | Refills: 1 | Status: SHIPPED | OUTPATIENT
Start: 2024-03-01

## 2024-03-01 SDOH — HEALTH STABILITY: PHYSICAL HEALTH: ON AVERAGE, HOW MANY DAYS PER WEEK DO YOU ENGAGE IN MODERATE TO STRENUOUS EXERCISE (LIKE A BRISK WALK)?: 1 DAY

## 2024-03-01 ASSESSMENT — SOCIAL DETERMINANTS OF HEALTH (SDOH): HOW OFTEN DO YOU GET TOGETHER WITH FRIENDS OR RELATIVES?: TWICE A WEEK

## 2024-03-01 NOTE — PATIENT INSTRUCTIONS
Preventive Care Advice   This is general advice given by our system to help you stay healthy. However, your care team may have specific advice just for you. Please talk to your care team about your preventive care needs.  Nutrition  Eat 5 or more servings of fruits and vegetables each day.  Try wheat bread, brown rice and whole grain pasta (instead of white bread, rice, and pasta).  Get enough calcium and vitamin D. Check the label on foods and aim for 100% of the RDA (recommended daily allowance).  Lifestyle  Exercise at least 150 minutes each week   (30 minutes a day, 5 days a week).  Do muscle strengthening activities 2 days a week. These help control your weight and prevent disease.  No smoking.  Wear sunscreen to prevent skin cancer.  Have a dental exam and cleaning every 6 months.  Yearly exams  See your health care team every year to talk about:  Any changes in your health.  Any medicines your care team has prescribed.  Preventive care, family planning, and ways to prevent chronic diseases.  Shots (vaccines)   HPV shots (up to age 26), if you've never had them before.  Hepatitis B shots (up to age 59), if you've never had them before.  COVID-19 shot: Get this shot when it's due.  Flu shot: Get a flu shot every year.  Tetanus shot: Get a tetanus shot every 10 years.  Pneumococcal, hepatitis A, and RSV shots: Ask your care team if you need these based on your risk.  Shingles shot (for age 50 and up).  General health tests  Diabetes screening:  Starting at age 35, Get screened for diabetes at least every 3 years.  If you are younger than age 35, ask your care team if you should be screened for diabetes.  Cholesterol test: At age 39, start having a cholesterol test every 5 years, or more often if advised.  Bone density scan (DEXA): At age 50, ask your care team if you should have this scan for osteoporosis (brittle bones).  Hepatitis C: Get tested at least once in your life.  STIs (sexually transmitted  infections)  Before age 24: Ask your care team if you should be screened for STIs.  After age 24: Get screened for STIs if you're at risk. You are at risk for STIs (including HIV) if:  You are sexually active with more than one person.  You don't use condoms every time.  You or a partner was diagnosed with a sexually transmitted infection.  If you are at risk for HIV, ask about PrEP medicine to prevent HIV.  Get tested for HIV at least once in your life, whether you are at risk for HIV or not.  Cancer screening tests  Cervical cancer screening: If you have a cervix, begin getting regular cervical cancer screening tests at age 21. Most people who have regular screenings with normal results can stop after age 65. Talk about this with your provider.  Breast cancer scan (mammogram): If you've ever had breasts, begin having regular mammograms starting at age 40. This is a scan to check for breast cancer.  Colon cancer screening: It is important to start screening for colon cancer at age 45.  Have a colonoscopy test every 10 years (or more often if you're at risk) Or, ask your provider about stool tests like a FIT test every year or Cologuard test every 3 years.  To learn more about your testing options, visit: https://www.The American Academy/706392.pdf.  For help making a decision, visit: https://bit.ly/lc28428.  Prostate cancer screening test: If you have a prostate and are age 55 to 69, ask your provider if you would benefit from a yearly prostate cancer screening test.  Lung cancer screening: If you are a current or former smoker age 50 to 80, ask your care team if ongoing lung cancer screenings are right for you.  For informational purposes only. Not to replace the advice of your health care provider. Copyright   2023 [employer]. All rights reserved. Clinically reviewed by the [facility] Transitions Program. [employer] 952592 - REV 01/24.

## 2024-03-01 NOTE — PROGRESS NOTES
"Preventive Care Visit  [facility] MANAV Graves MD, Family Medicine  Mar 1, 2024    Assessment & Plan     Routine general medical examination at a health care facility    - Comprehensive metabolic panel (BMP + Alb, Alk Phos, ALT, AST, Total. Bili, TP); Future    Other male erectile dysfunction  stable  - sildenafil (REVATIO) 20 MG tablet; Take 1 tablet (20 mg) by mouth as needed 30 min to 4 hrs before sex. Do not use with nitroglycerin, terazosin or doxazosin.    Screen for colon cancer    - Colonoscopy Screening  Referral; Future    Advance care planning  He bring the document    Screening for prostate cancer    - PSA, screen; Future    Screening for hyperlipidemia  - Lipid panel reflex to direct LDL Non-fasting; Future    Encounter for administration of vaccine  - TDAP 10-64Y (ADACEL,BOOSTRIX)    Patient has been advised of split billing requirements and indicates understanding: Yes      BMI  Estimated body mass index is 25.14 kg/m  as calculated from the following:    Height as of this encounter: 1.797 m (5' 10.75\").    Weight as of this encounter: 81.2 kg (179 lb).   Weight management plan: Discussed healthy diet and exercise guidelines    Counseling  Appropriate preventive services were discussed with this patient, including applicable screening as appropriate for fall prevention, nutrition, physical activity, Tobacco-use cessation, weight loss and cognition.  Checklist reviewing preventive services available has been given to the patient.  Reviewed patient's diet, addressing concerns and/or questions.   He is at risk for lack of exercise and has been provided with information to increase physical activity for the benefit of his well-being.           Lay Jasmine is a 48 year old, presenting for the following:  Physical        3/1/2024    10:16 AM   Additional Questions   Roomed by Kimberly PALMER         3/1/2024    10:16 AM   Patient Reported Additional Medications "   Patient reports taking the following new medications None        Health Care Directive  Patient does not have a Health Care Directive or Living Will: Patient states has Advance Directive and will bring in a copy to clinic.    HPI  Patient is requesting for refill on his Sildenafil that he uses for ED. He denies any medication side effects      3/1/2024   General Health   How would you rate your overall physical health? Good   Feel stress (tense, anxious, or unable to sleep) Not at all         3/1/2024   Nutrition   Three or more servings of calcium each day? Yes   Diet: Regular (no restrictions)   How many servings of fruit and vegetables per day? (!) 2-3   How many sweetened beverages each day? 0-1         3/1/2024   Exercise   Days per week of moderate/strenous exercise 1 day   (!) EXERCISE CONCERN      3/1/2024   Social Factors   Frequency of gathering with friends or relatives Twice a week   Worry food won't last until get money to buy more No   Food not last or not have enough money for food? No   Do you have housing?  Yes   Are you worried about losing your housing? No   Lack of transportation? No   Unable to get utilities (heat,electricity)? No         3/1/2024   Dental   Dentist two times every year? Yes         3/1/2024   TB Screening   Were you born outside of [place]?  (!) YES           Today's PHQ-2 Score:       3/1/2024     9:59 AM   PHQ-2 ( 1999 [employer])   Q1: Little interest or pleasure in doing things 0   Q2: Feeling down, depressed or hopeless 0   PHQ-2 Score 0   Q1: Little interest or pleasure in doing things Not at all   Q2: Feeling down, depressed or hopeless Not at all   PHQ-2 Score 0           3/1/2024   Substance Use   Alcohol more than 3/day or more than 7/wk No   Do you use any other substances recreationally? No     Social History     Tobacco Use    Smoking status: Never    Smokeless tobacco: Never   Vaping Use    Vaping Use: Never used   Substance Use Topics    Alcohol use: Yes     Comment: 1  "drink a month    Drug use: No         3/1/2024   One time HIV Screening   Previous HIV test? Yes         3/1/2024   STI Screening   New sexual partner(s) since last STI/HIV test? No   ASCVD Risk   The ASCVD Risk score (Brien ESPANA, et al., 2019) failed to calculate for the following reasons:    Cannot find a previous HDL lab    Cannot find a previous total cholesterol lab        3/1/2024   Contraception/Family Planning   Questions about contraception or family planning No       Reviewed and updated as needed this visit by Provider                    Past Medical History:   Diagnosis Date    NO ACTIVE PROBLEMS      Past Surgical History:   Procedure Laterality Date    EYE SURGERY  12/20/2016    PRK    None       OB History   No obstetric history on file.         Review of Systems  Constitutional, HEENT, cardiovascular, pulmonary, GI, , musculoskeletal, neuro, skin, endocrine and psych systems are negative, except as otherwise noted.     Objective    Exam  /78 (BP Location: Left arm, Patient Position: Sitting, Cuff Size: Adult Regular)   Pulse 70   Temp 98.1  F (36.7  C) (Tympanic)   Resp 16   Ht 1.797 m (5' 10.75\")   Wt 81.2 kg (179 lb)   SpO2 99%   BMI 25.14 kg/m     Estimated body mass index is 25.14 kg/m  as calculated from the following:    Height as of this encounter: 1.797 m (5' 10.75\").    Weight as of this encounter: 81.2 kg (179 lb).    Physical Exam  GENERAL: alert and no distress  EYES: Eyes grossly normal to inspection, PERRL and conjunctivae and sclerae normal  HENT: ear canals and TM's normal, nose and mouth without ulcers or lesions  NECK: no scars  RESP: lungs clear to auscultation - no rales, rhonchi or wheezes  CV: regular rate and rhythm, normal S1 S2, no peripheral edema  ABDOMEN: soft, nontender, no hepatosplenomegaly, no masses and bowel sounds normal  MS: no gross musculoskeletal defects noted, no edema  SKIN: no suspicious lesions or rashes  NEURO: Normal strength and " tone, mentation intact and speech normal  PSYCH: mentation appears normal, affect normal/bright      Signed Electronically by: Dia Graves MD

## 2024-03-05 ENCOUNTER — LAB (OUTPATIENT)
Dept: LAB | Facility: CLINIC | Age: 49
End: 2024-03-05
Payer: COMMERCIAL

## 2024-03-05 DIAGNOSIS — Z13.220 SCREENING FOR HYPERLIPIDEMIA: ICD-10-CM

## 2024-03-05 DIAGNOSIS — Z12.5 SCREENING FOR PROSTATE CANCER: ICD-10-CM

## 2024-03-05 DIAGNOSIS — Z00.00 ROUTINE GENERAL MEDICAL EXAMINATION AT A HEALTH CARE FACILITY: ICD-10-CM

## 2024-03-05 PROCEDURE — G0103 PSA SCREENING: HCPCS

## 2024-03-05 PROCEDURE — 36415 COLL VENOUS BLD VENIPUNCTURE: CPT

## 2024-03-05 PROCEDURE — 80061 LIPID PANEL: CPT

## 2024-03-05 PROCEDURE — 80053 COMPREHEN METABOLIC PANEL: CPT

## 2024-03-06 LAB
ALBUMIN SERPL BCG-MCNC: 4.6 G/DL (ref 3.5–5.2)
ALP SERPL-CCNC: 54 U/L (ref 40–150)
ALT SERPL W P-5'-P-CCNC: 21 U/L (ref 0–70)
ANION GAP SERPL CALCULATED.3IONS-SCNC: 12 MMOL/L (ref 7–15)
AST SERPL W P-5'-P-CCNC: 23 U/L (ref 0–45)
BILIRUB SERPL-MCNC: 0.3 MG/DL
BUN SERPL-MCNC: 14.8 MG/DL (ref 6–20)
CALCIUM SERPL-MCNC: 9.6 MG/DL (ref 8.6–10)
CHLORIDE SERPL-SCNC: 102 MMOL/L (ref 98–107)
CHOLEST SERPL-MCNC: 255 MG/DL
CREAT SERPL-MCNC: 1.07 MG/DL (ref 0.67–1.17)
DEPRECATED HCO3 PLAS-SCNC: 28 MMOL/L (ref 22–29)
EGFRCR SERPLBLD CKD-EPI 2021: 87 ML/MIN/1.73M2
FASTING STATUS PATIENT QL REPORTED: NO
GLUCOSE SERPL-MCNC: 78 MG/DL (ref 70–99)
HDLC SERPL-MCNC: 58 MG/DL
LDLC SERPL CALC-MCNC: 171 MG/DL
NONHDLC SERPL-MCNC: 197 MG/DL
POTASSIUM SERPL-SCNC: 3.8 MMOL/L (ref 3.4–5.3)
PROT SERPL-MCNC: 7.3 G/DL (ref 6.4–8.3)
PSA SERPL DL<=0.01 NG/ML-MCNC: 0.49 NG/ML (ref 0–2.5)
SODIUM SERPL-SCNC: 142 MMOL/L (ref 135–145)
TRIGL SERPL-MCNC: 130 MG/DL

## 2024-03-19 ENCOUNTER — TELEPHONE (OUTPATIENT)
Dept: GASTROENTEROLOGY | Facility: CLINIC | Age: 49
End: 2024-03-19
Payer: COMMERCIAL

## 2024-03-19 NOTE — TELEPHONE ENCOUNTER
Pre visit planning completed.      Procedure details:    Patient scheduled for Colonoscopy  on 3.28.2024.     Arrival time: 1205. Procedure time 1250    Facility location: [facility]; [address], [place] [address]. Check in location: 2nd Floor at Surgery desk.    Sedation type: Conscious sedation     Pre op exam needed? N/A    Indication for procedure: screening colonoscopy      Chart review:     Electronic implanted devices? No    Recent diagnosis of diverticulitis within the last 6 weeks? No    Diabetic? No      Medication review:    Anticoagulants? No    NSAIDS? No    Other medication HOLDING recommendations:  N/A      Prep for procedure:     Bowel prep recommendation: Standard Miralax  Due to: standard bowel prep.    Prep instructions sent via [employer]         Georgina Aguilar RN  Endoscopy Procedure Pre Assessment RN  [phone] option 4

## 2024-03-19 NOTE — TELEPHONE ENCOUNTER
Pre assessment completed for upcoming procedure.   (Please see previous telephone encounter notes for complete details)      Procedure details:    Arrival time and facility location reviewed.    Pre op exam needed? N/A    Designated  policy reviewed. Instructed to have someone stay 6  hours post procedure.       Medication review:    Medications reviewed. Please see supporting documentation below. Holding recommendations discussed (if applicable).       Prep for procedure:     Procedure prep instructions reviewed.        Any additional information needed:  N/A      Patient  verbalized understanding and had no questions or concerns at this time.      Georgina Aguilar RN  Endoscopy Procedure Pre Assessment RN  [phone] option 4

## 2024-03-19 NOTE — TELEPHONE ENCOUNTER
"Endoscopy Scheduling Screen    Have you had a positive Covid test in the last 14 days?  No    What is your communication preference for Instructions and/or Bowel Prep?   MyChart    What insurance is in the chart?  Other:  BCBS    Ordering/Referring Provider:   SAROJ NEWTON        (If ordering provider performs procedure, schedule with ordering provider unless otherwise instructed. )    BMI: Estimated body mass index is 25.14 kg/m  as calculated from the following:    Height as of 3/1/24: 1.797 m (5' 10.75\").    Weight as of 3/1/24: 81.2 kg (179 lb).     Sedation Ordered  moderate sedation.   If patient BMI > 50 do not schedule in [facility].    If patient BMI > 45 do not schedule at [facility].    Are you taking methadone or Suboxone?  No    Have you had difficulties, pain, or discomfort during past endoscopy procedures?  No    Are you taking any prescription medications for pain 3 or more times per week?   NO, No RN review required.    Do you have a history of malignant hyperthermia?  No    (Females) Are you currently pregnant?   No     Have you been diagnosed or told you have pulmonary hypertension?   No    Do you have an LVAD?  No    Have you been told you have moderate to severe sleep apnea?  No    Have you been told you have COPD, asthma, or any other lung disease?  No    Do you have any heart conditions?  No     Have you ever had or are you waiting for an organ transplant?  No. Continue scheduling, no site restrictions.    Have you had a stroke or transient ischemic attack (TIA aka \"mini stroke\" in the last 6 months?   No    Have you been diagnosed with or been told you have cirrhosis of the liver?   No    Are you currently on dialysis?   No    Do you need assistance transferring?   No    BMI: Estimated body mass index is 25.14 kg/m  as calculated from the following:    Height as of 3/1/24: 1.797 m (5' 10.75\").    Weight as of 3/1/24: 81.2 kg (179 lb).     Is patients BMI > 40 and scheduling location " [facility]?  No    Do you take an injectable medication for weight loss or diabetes (excluding insulin)?  No    Do you take the medication Naltrexone?  No    Do you take blood thinners?  No       Prep   Are you currently on dialysis or do you have chronic kidney disease?  No    Do you have a diagnosis of diabetes?  No    Do you have a diagnosis of cystic fibrosis (CF)?  No    On a regular basis do you go 3 -5 days between bowel movements?  No    BMI > 40?  No    Preferred Pharmacy:    [facility] [place] - [id number] [facility]  [address] [facility]  [place] [address]  Phone: [phone] Fax: [phone]    [id number] [place] - [address]  [address]  [place] [address]  Phone: [phone] Fax: [phone]    [id number] [place] [facility] [place] - [address]  [address]  [place] [address]  Phone: [phone] Fax: [phone]      Final Scheduling Details     Procedure scheduled  Colonoscopy    Surgeon:  Natasha     Date of procedure:  3/28     Pre-OP / PAC:   No - Not required for this site.    Location  [facility] - [facility] - Patient preference.    Sedation   Moderate Sedation - Per order.      Patient Reminders:   You will receive a call from a Nurse to review instructions and health history.  This assessment must be completed prior to your procedure.  Failure to complete the Nurse assessment may result in the procedure being cancelled.      On the day of your procedure, please designate an adult(s) who can drive you home stay with you for the next 24 hours. The medicines used in the exam will make you sleepy. You will not be able to drive.      You cannot take public transportation, ride share services, or non-medical taxi service without a responsible caregiver.  Medical transport services are allowed with the requirement that a responsible caregiver will receive you at your destination.  We require that drivers and caregivers are  confirmed prior to your procedure.

## 2024-03-25 RX ORDER — PROCHLORPERAZINE MALEATE 10 MG
10 TABLET ORAL EVERY 6 HOURS PRN
Status: CANCELLED | OUTPATIENT
Start: 2024-03-25

## 2024-03-25 RX ORDER — FLUMAZENIL 0.1 MG/ML
0.2 INJECTION, SOLUTION INTRAVENOUS
Status: CANCELLED | OUTPATIENT
Start: 2024-03-25 | End: 2024-03-25

## 2024-03-25 RX ORDER — ONDANSETRON 4 MG/1
4 TABLET, ORALLY DISINTEGRATING ORAL EVERY 6 HOURS PRN
Status: CANCELLED | OUTPATIENT
Start: 2024-03-25

## 2024-03-25 RX ORDER — NALOXONE HYDROCHLORIDE 0.4 MG/ML
0.2 INJECTION, SOLUTION INTRAMUSCULAR; INTRAVENOUS; SUBCUTANEOUS
Status: CANCELLED | OUTPATIENT
Start: 2024-03-25

## 2024-03-25 RX ORDER — ONDANSETRON 2 MG/ML
4 INJECTION INTRAMUSCULAR; INTRAVENOUS EVERY 6 HOURS PRN
Status: CANCELLED | OUTPATIENT
Start: 2024-03-25

## 2024-03-25 RX ORDER — NALOXONE HYDROCHLORIDE 0.4 MG/ML
0.4 INJECTION, SOLUTION INTRAMUSCULAR; INTRAVENOUS; SUBCUTANEOUS
Status: CANCELLED | OUTPATIENT
Start: 2024-03-25

## 2024-03-28 ENCOUNTER — HOSPITAL ENCOUNTER (OUTPATIENT)
Facility: AMBULATORY SURGERY CENTER | Age: 49
Discharge: HOME | End: 2024-03-28
Attending: INTERNAL MEDICINE | Admitting: INTERNAL MEDICINE
Payer: COMMERCIAL

## 2024-03-28 VITALS
OXYGEN SATURATION: 99 % | SYSTOLIC BLOOD PRESSURE: 125 MMHG | RESPIRATION RATE: 16 BRPM | HEART RATE: 69 BPM | DIASTOLIC BLOOD PRESSURE: 79 MMHG

## 2024-03-28 LAB — COLONOSCOPY: NORMAL

## 2024-03-28 PROCEDURE — G8918 PT W/O PREOP ORDER IV AB PRO: HCPCS

## 2024-03-28 PROCEDURE — 45385 COLONOSCOPY W/LESION REMOVAL: CPT

## 2024-03-28 PROCEDURE — G8907 PT DOC NO EVENTS ON DISCHARG: HCPCS

## 2024-03-28 PROCEDURE — 88305 TISSUE EXAM BY PATHOLOGIST: CPT | Performed by: PATHOLOGY

## 2024-03-28 RX ORDER — ONDANSETRON 2 MG/ML
4 INJECTION INTRAMUSCULAR; INTRAVENOUS
Status: DISCONTINUED | OUTPATIENT
Start: 2024-03-28 | End: 2024-03-29 | Stop reason: HOSPADM

## 2024-03-28 RX ORDER — LIDOCAINE 40 MG/G
CREAM TOPICAL
Status: DISCONTINUED | OUTPATIENT
Start: 2024-03-28 | End: 2024-03-29 | Stop reason: HOSPADM

## 2024-03-28 RX ORDER — FENTANYL CITRATE 50 UG/ML
INJECTION, SOLUTION INTRAMUSCULAR; INTRAVENOUS PRN
Status: DISCONTINUED | OUTPATIENT
Start: 2024-03-28 | End: 2024-03-28 | Stop reason: HOSPADM

## 2024-03-28 NOTE — H&P
[facility] Anesthesia Pre-op History and Physical    Kenroy Garrido MRN# [id number]   Age: 48 year old YOB: 1975            Date of Exam 3/28/2024       Primary care provider: Dianne Fofana         Chief Complaint and/or Reason for Procedure:     CRC screening - mother with colon cancer diagnosed age <60. This is patient's first colonoscopy       Active problem list:     Patient Active Problem List    Diagnosis Date Noted    Cervical radiculopathy 10/30/2020     Priority: Medium    SHELLI (generalized anxiety disorder) 07/19/2018     Priority: Medium    Erectile dysfunction, unspecified erectile dysfunction type 07/19/2018     Priority: Medium    CARDIOVASCULAR SCREENING; LDL GOAL LESS THAN 160 01/20/2011     Priority: Medium            Medications (include herbals and vitamins):   Any Plavix use in the last 7 days? No     Current Outpatient Medications   Medication Sig    atorvastatin (LIPITOR) 20 MG tablet Take 1 tablet (20 mg) by mouth daily (Patient not taking: Reported on 8/24/2018)    hydrocortisone 2.5 % cream Apply topically 2 times daily When rash is flared only then stop (Patient not taking: Reported on 3/1/2024)    ketoconazole (NIZORAL) 2 % external cream Apply to rash BID until 2 weeks after rash is fully resolved (Patient not taking: Reported on 3/1/2024)    mupirocin (BACTROBAN) 2 % external ointment Apply topically 2 times daily as needed (Use if antifungal tolnaftate spray is not helping.) (Patient not taking: Reported on 6/2/2023)    Pseudoeph-Doxylamine-DM-APAP (NYQUIL MULTI-SYMPTOM OR) Take 10 mLs by mouth nightly as needed (Patient not taking: Reported on 6/16/2022)    sildenafil (REVATIO) 20 MG tablet Take 1 tablet (20 mg) by mouth as needed 30 min to 4 hrs before sex. Do not use with nitroglycerin, terazosin or doxazosin.    tolnaftate (LAMISIL) 1 % external spray Externally apply topically 2 times daily as needed (For jock itch.) (Patient not taking: Reported on 6/2/2023)     triamcinolone (KENALOG) 0.1 % external cream Apply topically 2 times daily as needed (itching) (Patient not taking: Reported on 6/2/2023)     Current Facility-Administered Medications   Medication    lidocaine (LMX4) kit    lidocaine 1 % 0.1-1 mL    ondansetron (ZOFRAN) injection 4 mg    sodium chloride (PF) 0.9% PF flush 3 mL    sodium chloride (PF) 0.9% PF flush 3 mL             Allergies:    No Known Allergies  Allergy to Latex? No  Allergy to tape?   No  Intolerances:             Physical Exam:   All vitals have been reviewed  Patient Vitals for the past 8 hrs:   BP Resp SpO2   03/28/24 1201 132/81 16 97 %     No intake/output data recorded.  Lungs:   No increased work of breathing, good air exchange, clear to auscultation bilaterally, no crackles or wheezing     Cardiovascular:   normal S1 and S2             Lab / Radiology Results:            Anesthetic risk and/or ASA classification:     2  Sherrell Kaur DO

## 2024-04-01 LAB
PATH REPORT.COMMENTS IMP SPEC: NORMAL
PATH REPORT.COMMENTS IMP SPEC: NORMAL
PATH REPORT.FINAL DX SPEC: NORMAL
PATH REPORT.GROSS SPEC: NORMAL
PATH REPORT.MICROSCOPIC SPEC OTHER STN: NORMAL
PATH REPORT.RELEVANT HX SPEC: NORMAL
PHOTO IMAGE: NORMAL

## 2024-09-20 DIAGNOSIS — L29.9 PRURITUS: ICD-10-CM

## 2024-09-20 DIAGNOSIS — L30.9 DERMATITIS: ICD-10-CM

## 2024-09-25 RX ORDER — KETOCONAZOLE 20 MG/G
CREAM TOPICAL
Qty: 60 G | Refills: 11 | Status: SHIPPED | OUTPATIENT
Start: 2024-09-25

## 2024-12-03 ENCOUNTER — VIRTUAL VISIT (OUTPATIENT)
Dept: FAMILY MEDICINE | Facility: CLINIC | Age: 49
End: 2024-12-03
Payer: COMMERCIAL

## 2024-12-03 DIAGNOSIS — R05.1 ACUTE COUGH: ICD-10-CM

## 2024-12-03 DIAGNOSIS — J01.90 ACUTE SINUSITIS WITH SYMPTOMS > 10 DAYS: Primary | ICD-10-CM

## 2024-12-03 PROCEDURE — G2211 COMPLEX E/M VISIT ADD ON: HCPCS | Mod: 95 | Performed by: PHYSICIAN ASSISTANT

## 2024-12-03 PROCEDURE — 99213 OFFICE O/P EST LOW 20 MIN: CPT | Mod: 95 | Performed by: PHYSICIAN ASSISTANT

## 2024-12-03 RX ORDER — BENZONATATE 200 MG/1
200 CAPSULE ORAL 3 TIMES DAILY PRN
Qty: 90 CAPSULE | Refills: 1 | Status: SHIPPED | OUTPATIENT
Start: 2024-12-03

## 2024-12-03 NOTE — PROGRESS NOTES
Kenroy is a 49 year old who is being evaluated via a billable video visit.    How would you like to obtain your AVS? [employer]  If the video visit is dropped, the invitation should be resent by: Text to cell phone: [phone]  Will anyone else be joining your video visit? No      Assessment & Plan     Acute sinusitis with symptoms > 10 days    - amoxicillin-clavulanate (AUGMENTIN) 875-125 MG tablet; Take 1 tablet by mouth 2 times daily for 10 days.    Acute cough    Take with food. Side effects discussed.  Call with worsening symptoms or if no improvement in 3-5 days.  Analgesics for pain with food as needed.  Consider in person visit for exam and cxr in future if not improving  - amoxicillin-clavulanate (AUGMENTIN) 875-125 MG tablet; Take 1 tablet by mouth 2 times daily for 10 days.  - benzonatate (TESSALON) 200 MG capsule; Take 1 capsule (200 mg) by mouth 3 times daily as needed for cough.        The longitudinal plan of care for the diagnosis(es)/condition(s) as documented were addressed during this visit. Due to the added complexity in care, I will continue to support Kenroy in the subsequent management and with ongoing continuity of care.      Follow up if symptoms worsen or change. To ER with severe worsening symptoms.       Subjective   Kenroy is a 49 year old, presenting for the following health issues:  Illness        12/3/2024    11:20 AM   Additional Questions   Roomed by Patient completed chart review and e-checkin questionnaires through [employer] (Tammy BLACKWOOD CMA)     History of Present Illness       Reason for visit:  Persistent cough and congestion  Symptom onset:  1-2 weeks ago  Symptom intensity:  Moderate  Symptom progression:  Staying the same  Had these symptoms before:  No   He is taking medications regularly.       Sick for over a week. Not improving. Gets coughing attacks. Some sinus pressure and headaches. Mild sob. Has lots of post nasal drainage which makes him cough. Makes it hard to sleep. Coughing  Comes in waves.  No known exposure. Didn't do home test.   No known history of sinus infection. No history of pneumonia. No fevers. Is getting green or yellow drainage when he spits in the morning. Has frontal sinus pain. No history of inhaler use. Has tried otc.             Objective           Vitals:  No vitals were obtained today due to virtual visit.    Physical Exam   GENERAL: alert and no distress  EYES: Eyes grossly normal to inspection.  No discharge or erythema, or obvious scleral/conjunctival abnormalities.  RESP: No audible wheeze, or visible cyanosis.  Cough is intermittent.   SKIN: Visible skin clear. No significant rash, abnormal pigmentation or lesions.  NEURO: Cranial nerves grossly intact.  Mentation and speech appropriate for age.  PSYCH: Appropriate affect, tone, and pace of words          Video-Visit Details    Type of service:  Video Visit   Originating Location (pt. Location): Home    Distant Location (provider location):  On-site  Platform used for Video Visit: Dominick  Signed Electronically by: Janine Carrillo PA-C

## 2025-03-12 ENCOUNTER — OFFICE VISIT (OUTPATIENT)
Dept: FAMILY MEDICINE | Facility: CLINIC | Age: 50
End: 2025-03-12
Attending: STUDENT IN AN ORGANIZED HEALTH CARE EDUCATION/TRAINING PROGRAM
Payer: COMMERCIAL

## 2025-03-12 ENCOUNTER — ANCILLARY PROCEDURE (OUTPATIENT)
Dept: GENERAL RADIOLOGY | Facility: CLINIC | Age: 50
End: 2025-03-12
Attending: FAMILY MEDICINE
Payer: COMMERCIAL

## 2025-03-12 VITALS
TEMPERATURE: 97.1 F | HEIGHT: 70 IN | WEIGHT: 178.8 LBS | OXYGEN SATURATION: 98 % | BODY MASS INDEX: 25.6 KG/M2 | RESPIRATION RATE: 16 BRPM | DIASTOLIC BLOOD PRESSURE: 74 MMHG | SYSTOLIC BLOOD PRESSURE: 122 MMHG | HEART RATE: 94 BPM

## 2025-03-12 DIAGNOSIS — R07.9 CHEST PAIN, UNSPECIFIED TYPE: ICD-10-CM

## 2025-03-12 DIAGNOSIS — R06.09 DYSPNEA ON EXERTION: ICD-10-CM

## 2025-03-12 DIAGNOSIS — Z13.220 LIPID SCREENING: ICD-10-CM

## 2025-03-12 DIAGNOSIS — Z00.00 ROUTINE GENERAL MEDICAL EXAMINATION AT A HEALTH CARE FACILITY: Primary | ICD-10-CM

## 2025-03-12 LAB
BASOPHILS # BLD AUTO: 0.1 10E3/UL (ref 0–0.2)
BASOPHILS NFR BLD AUTO: 1 %
EOSINOPHIL # BLD AUTO: 0.1 10E3/UL (ref 0–0.7)
EOSINOPHIL NFR BLD AUTO: 2 %
ERYTHROCYTE [DISTWIDTH] IN BLOOD BY AUTOMATED COUNT: 12.7 % (ref 10–15)
HCT VFR BLD AUTO: 40.3 % (ref 40–53)
HGB BLD-MCNC: 13.9 G/DL (ref 13.3–17.7)
IMM GRANULOCYTES # BLD: 0 10E3/UL
IMM GRANULOCYTES NFR BLD: 0 %
LYMPHOCYTES # BLD AUTO: 1.8 10E3/UL (ref 0.8–5.3)
LYMPHOCYTES NFR BLD AUTO: 36 %
MCH RBC QN AUTO: 30.3 PG (ref 26.5–33)
MCHC RBC AUTO-ENTMCNC: 34.5 G/DL (ref 31.5–36.5)
MCV RBC AUTO: 88 FL (ref 78–100)
MONOCYTES # BLD AUTO: 0.4 10E3/UL (ref 0–1.3)
MONOCYTES NFR BLD AUTO: 8 %
NEUTROPHILS # BLD AUTO: 2.6 10E3/UL (ref 1.6–8.3)
NEUTROPHILS NFR BLD AUTO: 52 %
PLATELET # BLD AUTO: 278 10E3/UL (ref 150–450)
RBC # BLD AUTO: 4.58 10E6/UL (ref 4.4–5.9)
WBC # BLD AUTO: 5 10E3/UL (ref 4–11)

## 2025-03-12 PROCEDURE — 99396 PREV VISIT EST AGE 40-64: CPT | Performed by: FAMILY MEDICINE

## 2025-03-12 PROCEDURE — 85025 COMPLETE CBC W/AUTO DIFF WBC: CPT | Performed by: FAMILY MEDICINE

## 2025-03-12 PROCEDURE — 3074F SYST BP LT 130 MM HG: CPT | Performed by: FAMILY MEDICINE

## 2025-03-12 PROCEDURE — 84443 ASSAY THYROID STIM HORMONE: CPT | Performed by: FAMILY MEDICINE

## 2025-03-12 PROCEDURE — 99214 OFFICE O/P EST MOD 30 MIN: CPT | Mod: 25 | Performed by: FAMILY MEDICINE

## 2025-03-12 PROCEDURE — 93000 ELECTROCARDIOGRAM COMPLETE: CPT | Performed by: FAMILY MEDICINE

## 2025-03-12 PROCEDURE — 71046 X-RAY EXAM CHEST 2 VIEWS: CPT | Mod: TC | Performed by: RADIOLOGY

## 2025-03-12 PROCEDURE — 80048 BASIC METABOLIC PNL TOTAL CA: CPT | Performed by: FAMILY MEDICINE

## 2025-03-12 PROCEDURE — 84484 ASSAY OF TROPONIN QUANT: CPT | Performed by: FAMILY MEDICINE

## 2025-03-12 PROCEDURE — 3078F DIAST BP <80 MM HG: CPT | Performed by: FAMILY MEDICINE

## 2025-03-12 PROCEDURE — 36415 COLL VENOUS BLD VENIPUNCTURE: CPT | Performed by: FAMILY MEDICINE

## 2025-03-12 RX ORDER — ASPIRIN 81 MG/1
81 TABLET ORAL DAILY
COMMUNITY
Start: 2025-03-12

## 2025-03-12 SDOH — HEALTH STABILITY: PHYSICAL HEALTH: ON AVERAGE, HOW MANY DAYS PER WEEK DO YOU ENGAGE IN MODERATE TO STRENUOUS EXERCISE (LIKE A BRISK WALK)?: 1 DAY

## 2025-03-12 ASSESSMENT — SOCIAL DETERMINANTS OF HEALTH (SDOH): HOW OFTEN DO YOU GET TOGETHER WITH FRIENDS OR RELATIVES?: ONCE A WEEK

## 2025-03-12 NOTE — PROGRESS NOTES
"Preventive Care Visit  [facility] MANAV Fofana MD, Family Medicine  Mar 12, 2025      Assessment & Plan     Kenroy was seen today for physical.    Diagnoses and all orders for this visit:    Routine general medical examination at a health care facility  -     PRIMARY CARE FOLLOW-UP SCHEDULING    Lipid screening  -     Lipid panel reflex to direct LDL Fasting    Chest pain, unspecified type, intermitent  -     XR Chest 2 Views  -     EKG 12-lead complete w/read - Clinics  -     Troponin T, High Sensitivity; Future  -     CBC with platelets and differential; Future  -     TSH with free T4 reflex; Future  -     Basic metabolic panel  (Ca, Cl, CO2, Creat, Gluc, K, Na, BUN); Future  -     Echocardiogram Exercise Stress; Future  -     aspirin 81 MG EC tablet; Take 1 tablet (81 mg) by mouth daily.    Dyspnea on exertion  -     XR Chest 2 Views  -     EKG 12-lead complete w/read - Clinics  -     CBC with platelets and differential  -     TSH with free T4 reflex        Patient has been advised of split billing requirements and indicates understanding: Yes        BMI  Estimated body mass index is 25.45 kg/m  as calculated from the following:    Height as of this encounter: 1.785 m (5' 10.28\").    Weight as of this encounter: 81.1 kg (178 lb 12.8 oz).   Weight management plan: Discussed healthy diet and exercise guidelines    Counseling  Appropriate preventive services were addressed with this patient via screening, questionnaire, or discussion as appropriate for fall prevention, nutrition, physical activity, Tobacco-use cessation, social engagement, weight loss and cognition.  Checklist reviewing preventive services available has been given to the patient.  Reviewed patient's diet, addressing concerns and/or questions.   He is at risk for lack of exercise and has been provided with information to increase physical activity for the benefit of his well-being.       Return in about 2 weeks (around " 3/26/2025), or if symptoms worsen or fail to improve, for a Physical Exam at your earliest convenience..      Subjective   Kenroy is a 49 year old, presenting for the following:  Physical (SOB concerns since being getting sick in Nov of 2024 )        3/12/2025    10:19 AM   Additional Questions   Roomed by Alea   Accompanied by self          HPI    Kenroy is a pleasant 49 year old male patient of mine here for his Annual Physical and chest pain with shortness of breath.       CHEST PAIN   Onset: intermittent x 3 months  Description:   Location:  mid chest area  Character: achey  Radiation: on left side  Duration: intermittent   Intensity: moderate  Progression of Symptoms:  intermittent  Accompanying Signs & Symptoms:  Shortness of breath: YES- with dyspnea on exertion  Sweating: no   Nausea/vomiting: no   Lightheadedness: no   Palpitations: No  Fever/Chills: no   Cough: no   Heartburn: no   History:   Family history of heart disease no   Tobacco use: no   Precipitating factors:   Worse with exertion: no   Worse with deep breaths :  no   Related to food: no   Alleviating factors:  Unknown      Therapies Tried and outcome: reports having URI symptoms since Nov 2024, treated with antibiotics x 2 rounds ( seen for a Virtual visit in 12/2025 and treated with Augmentin for acute sinusitis and later seen at a [facility])    Reports that the cough has since resolved but the intermittent chest pain with dyspnea on exertion persists.     Otherwise healthy.     HEALTH CARE MAINTENANCE: Labs               3/12/2025   General Health   How would you rate your overall physical health? Good   Feel stress (tense, anxious, or unable to sleep) Only a little   (!) STRESS CONCERN      3/12/2025   Nutrition   Three or more servings of calcium each day? Yes   Diet: Regular (no restrictions)   How many servings of fruit and vegetables per day? (!) 2-3   How many sweetened beverages each day? 0-1         3/12/2025   Exercise   Days per  week of moderate/strenous exercise 1 day   (!) EXERCISE CONCERN      3/12/2025   Social Factors   Frequency of gathering with friends or relatives Once a week   Worry food won't last until get money to buy more No   Food not last or not have enough money for food? No   Do you have housing? (Housing is defined as stable permanent housing and does not include staying ouside in a car, in a tent, in an abandoned building, in an overnight shelter, or couch-surfing.) Yes   Are you worried about losing your housing? No   Lack of transportation? No   Unable to get utilities (heat,electricity)? No         3/12/2025   Dental   Dentist two times every year? Yes           3/1/2024   TB Screening   Were you born outside of the [place]? (!) YES             Today's PHQ-2 Score:       3/12/2025    10:14 AM   PHQ-2 ( 1999 [employer])   Q1: Little interest or pleasure in doing things 0   Q2: Feeling down, depressed or hopeless 0   PHQ-2 Score 0    Q1: Little interest or pleasure in doing things Not at all   Q2: Feeling down, depressed or hopeless Not at all   PHQ-2 Score 0       Patient-reported           3/12/2025   Substance Use   Alcohol more than 3/day or more than 7/wk No   Do you use any other substances recreationally? No     Social History     Tobacco Use    Smoking status: Never     Passive exposure: Never    Smokeless tobacco: Never   Vaping Use    Vaping status: Never Used   Substance Use Topics    Alcohol use: Yes     Comment: 1 drink a month    Drug use: No           3/12/2025   One time HIV Screening   Previous HIV test? Yes         3/12/2025   STI Screening   New sexual partner(s) since last STI/HIV test? No   ASCVD Risk   The 10-year ASCVD risk score (Brien ESPANA, et al., 2019) is: 2.9%    Values used to calculate the score:      Age: 49 years      Sex: Male      Is Non- : No      Diabetic: No      Tobacco smoker: No      Systolic Blood Pressure: 122 mmHg      Is BP treated: No      HDL  Cholesterol: 58 mg/dL      Total Cholesterol: 255 mg/dL        3/12/2025   Contraception/Family Planning   Questions about contraception or family planning No        Reviewed and updated as needed this visit by Provider     Meds  Problems               Past Medical History:   Diagnosis Date    NO ACTIVE PROBLEMS      Past Surgical History:   Procedure Laterality Date    COLONOSCOPY N/A 3/28/2024    Procedure: COLONOSCOPY, FLEXIBLE, WITH LESION REMOVAL USING SNARE;  Surgeon: Sherrell Kaur DO;  Location: [facility] OR    COLONOSCOPY WITH CO2 INSUFFLATION N/A 3/28/2024    Procedure: Colonoscopy with CO2 insufflation;  Surgeon: Sherrell Kaur DO;  Location: [facility] OR    EYE SURGERY  12/20/2016    PRK    None       Lab work is in process  Labs reviewed in EPIC  BP Readings from Last 3 Encounters:   03/12/25 122/74   03/28/24 125/79   03/01/24 128/78    Wt Readings from Last 3 Encounters:   03/12/25 81.1 kg (178 lb 12.8 oz)   03/01/24 81.2 kg (179 lb)   06/16/22 77.2 kg (170 lb 3.2 oz)                  Patient Active Problem List   Diagnosis    CARDIOVASCULAR SCREENING; LDL GOAL LESS THAN 160    SHELLI (generalized anxiety disorder)    Erectile dysfunction, unspecified erectile dysfunction type    Cervical radiculopathy     Past Surgical History:   Procedure Laterality Date    COLONOSCOPY N/A 3/28/2024    Procedure: COLONOSCOPY, FLEXIBLE, WITH LESION REMOVAL USING SNARE;  Surgeon: Sherrell Kaur DO;  Location: [facility] OR    COLONOSCOPY WITH CO2 INSUFFLATION N/A 3/28/2024    Procedure: Colonoscopy with CO2 insufflation;  Surgeon: Sherrell Kaur DO;  Location: [facility] OR    EYE SURGERY  12/20/2016    PRK    None         Social History     Tobacco Use    Smoking status: Never     Passive exposure: Never    Smokeless tobacco: Never   Substance Use Topics    Alcohol use: Yes     Comment: 1 drink a month     Family History   Problem Relation Age of Onset    Cancer Mother     Other Cancer Mother     Hyperlipidemia Father      "Hyperlipidemia Brother          Current Outpatient Medications   Medication Sig Dispense Refill    aspirin 81 MG EC tablet Take 1 tablet (81 mg) by mouth daily.      ketoconazole (NIZORAL) 2 % external cream Apply to rash BID until 2 weeks after rash is fully resolved 60 g 11     No Known Allergies      Review of Systems  Constitutional, HEENT, cardiovascular, pulmonary, gi and gu systems are negative, except as otherwise noted.     Objective    Exam  /74   Pulse 94   Temp 97.1  F (36.2  C) (Temporal)   Resp 16   Ht 1.785 m (5' 10.28\")   Wt 81.1 kg (178 lb 12.8 oz)   SpO2 98%   BMI 25.45 kg/m     Estimated body mass index is 25.45 kg/m  as calculated from the following:    Height as of this encounter: 1.785 m (5' 10.28\").    Weight as of this encounter: 81.1 kg (178 lb 12.8 oz).    Physical Exam  GENERAL: alert and no distress  EYES: Eyes grossly normal to inspection, PERRL and conjunctivae and sclerae normal  HENT: ear canals and TM's normal, nose and mouth without ulcers or lesions  NECK: no adenopathy, no asymmetry, masses, or scars  RESP: lungs clear to auscultation - no rales, rhonchi or wheezes  CV: regular rate and rhythm, normal S1 S2, no S3 or S4, no murmur, click or rub, no peripheral edema  ABDOMEN: soft, nontender, no hepatosplenomegaly, no masses and bowel sounds normal  MS: no gross musculoskeletal defects noted, no edema  SKIN: no suspicious lesions or rashes  NEURO: Normal strength and tone, mentation intact and speech normal  PSYCH: mentation appears normal, affect normal/bright    DATA  Reviewed and discussed with patient prior to discharge.  EKG: appears normal, NSR, normal axis, normal intervals, no acute ST/T changes c/w ischemia, no LVH by voltage criteria, there are no prior tracings available    Results for orders placed or performed in visit on 03/12/25   XR Chest 2 Views     Status: None    Narrative    EXAM: XR CHEST 2 VIEWS  LOCATION: [facility]  DATE: " 3/12/2025    INDICATION:  Chest pain, unspecified type, Dyspnea on exertion  COMPARISON: Chest x-ray dated 8/24/2018.      Impression    IMPRESSION: Negative chest.   CBC with platelets and differential     Status: None   Result Value Ref Range    WBC Count 5.0 4.0 - 11.0 10e3/uL    RBC Count 4.58 4.40 - 5.90 10e6/uL    Hemoglobin 13.9 13.3 - 17.7 g/dL    Hematocrit 40.3 40.0 - 53.0 %    MCV 88 78 - 100 fL    MCH 30.3 26.5 - 33.0 pg    MCHC 34.5 31.5 - 36.5 g/dL    RDW 12.7 10.0 - 15.0 %    Platelet Count 278 150 - 450 10e3/uL    % Neutrophils 52 %    % Lymphocytes 36 %    % Monocytes 8 %    % Eosinophils 2 %    % Basophils 1 %    % Immature Granulocytes 0 %    Absolute Neutrophils 2.6 1.6 - 8.3 10e3/uL    Absolute Lymphocytes 1.8 0.8 - 5.3 10e3/uL    Absolute Monocytes 0.4 0.0 - 1.3 10e3/uL    Absolute Eosinophils 0.1 0.0 - 0.7 10e3/uL    Absolute Basophils 0.1 0.0 - 0.2 10e3/uL    Absolute Immature Granulocytes 0.0 <=0.4 10e3/uL   CBC with platelets and differential     Status: None    Narrative    The following orders were created for panel order CBC with platelets and differential.  Procedure                               Abnormality         Status                     ---------                               -----------         ------                     CBC with platelets and ...[[id number]]                      Final result                 Please view results for these tests on the individual orders.         Signed Electronically by: Dianne Fofana MD

## 2025-03-12 NOTE — PATIENT INSTRUCTIONS
Patient Education   Preventive Care Advice   This is general advice given by our system to help you stay healthy. However, your care team may have specific advice just for you. Please talk to your care team about your preventive care needs.  Nutrition  Eat 5 or more servings of fruits and vegetables each day.  Try wheat bread, brown rice and whole grain pasta (instead of white bread, rice, and pasta).  Get enough calcium and vitamin D. Check the label on foods and aim for 100% of the RDA (recommended daily allowance).  Lifestyle  Exercise at least 150 minutes each week  (30 minutes a day, 5 days a week).  Do muscle strengthening activities 2 days a week. These help control your weight and prevent disease.  No smoking.  Wear sunscreen to prevent skin cancer.  Have a dental exam and cleaning every 6 months.  Yearly exams  See your health care team every year to talk about:  Any changes in your health.  Any medicines your care team has prescribed.  Preventive care, family planning, and ways to prevent chronic diseases.  Shots (vaccines)   HPV shots (up to age 26), if you've never had them before.  Hepatitis B shots (up to age 59), if you've never had them before.  COVID-19 shot: Get this shot when it's due.  Flu shot: Get a flu shot every year.  Tetanus shot: Get a tetanus shot every 10 years.  Pneumococcal, hepatitis A, and RSV shots: Ask your care team if you need these based on your risk.  Shingles shot (for age 50 and up)  General health tests  Diabetes screening:  Starting at age 35, Get screened for diabetes at least every 3 years.  If you are younger than age 35, ask your care team if you should be screened for diabetes.  Cholesterol test: At age 39, start having a cholesterol test every 5 years, or more often if advised.  Bone density scan (DEXA): At age 50, ask your care team if you should have this scan for osteoporosis (brittle bones).  Hepatitis C: Get tested at least once in your life.  STIs (sexually  transmitted infections)  Before age 24: Ask your care team if you should be screened for STIs.  After age 24: Get screened for STIs if you're at risk. You are at risk for STIs (including HIV) if:  You are sexually active with more than one person.  You don't use condoms every time.  You or a partner was diagnosed with a sexually transmitted infection.  If you are at risk for HIV, ask about PrEP medicine to prevent HIV.  Get tested for HIV at least once in your life, whether you are at risk for HIV or not.  Cancer screening tests  Cervical cancer screening: If you have a cervix, begin getting regular cervical cancer screening tests starting at age 21.  Breast cancer scan (mammogram): If you've ever had breasts, begin having regular mammograms starting at age 40. This is a scan to check for breast cancer.  Colon cancer screening: It is important to start screening for colon cancer at age 45.  Have a colonoscopy test every 10 years (or more often if you're at risk) Or, ask your provider about stool tests like a FIT test every year or Cologuard test every 3 years.  To learn more about your testing options, visit:   .  For help making a decision, visit:   https://bit.ly/ba49398.  Prostate cancer screening test: If you have a prostate, ask your care team if a prostate cancer screening test (PSA) at age 55 is right for you.  Lung cancer screening: If you are a current or former smoker ages 50 to 80, ask your care team if ongoing lung cancer screenings are right for you.  For informational purposes only. Not to replace the advice of your health care provider. Copyright   2023 [employer]. All rights reserved. Clinically reviewed by the [facility] Transitions Program. [employer] [id number] - REV 01/24.

## 2025-03-13 LAB
ANION GAP SERPL CALCULATED.3IONS-SCNC: 11 MMOL/L (ref 7–15)
BUN SERPL-MCNC: 14.9 MG/DL (ref 6–20)
CALCIUM SERPL-MCNC: 9.4 MG/DL (ref 8.8–10.4)
CHLORIDE SERPL-SCNC: 101 MMOL/L (ref 98–107)
CREAT SERPL-MCNC: 1.05 MG/DL (ref 0.67–1.17)
EGFRCR SERPLBLD CKD-EPI 2021: 88 ML/MIN/1.73M2
GLUCOSE SERPL-MCNC: 94 MG/DL (ref 70–99)
HCO3 SERPL-SCNC: 26 MMOL/L (ref 22–29)
POTASSIUM SERPL-SCNC: 4.2 MMOL/L (ref 3.4–5.3)
SODIUM SERPL-SCNC: 138 MMOL/L (ref 135–145)
TROPONIN T SERPL HS-MCNC: 6 NG/L
TSH SERPL DL<=0.005 MIU/L-ACNC: 1.26 UIU/ML (ref 0.3–4.2)

## 2025-03-16 LAB
CHOLEST SERPL-MCNC: 245 MG/DL
HDLC SERPL-MCNC: 51 MG/DL
LDLC SERPL CALC-MCNC: 180 MG/DL
NONHDLC SERPL-MCNC: 194 MG/DL
TRIGL SERPL-MCNC: 72 MG/DL

## 2025-03-17 DIAGNOSIS — E78.2 MIXED HYPERLIPIDEMIA: Primary | ICD-10-CM

## (undated) DEVICE — KIT ENDO FIRST STEP DISINFECTANT 200ML W/POUCH EP-4

## (undated) DEVICE — CLIP HEMOSTASIS ASSURANCE W16 MM BX00711884

## (undated) DEVICE — PREP CHLORAPREP 26ML TINTED ORANGE  260815

## (undated) DEVICE — PAD CHUX UNDERPAD 23X24" 7136

## (undated) RX ORDER — FENTANYL CITRATE 50 UG/ML
INJECTION, SOLUTION INTRAMUSCULAR; INTRAVENOUS
Status: DISPENSED
Start: 2024-03-28